# Patient Record
Sex: MALE | Race: WHITE | HISPANIC OR LATINO | Employment: OTHER | URBAN - METROPOLITAN AREA
[De-identification: names, ages, dates, MRNs, and addresses within clinical notes are randomized per-mention and may not be internally consistent; named-entity substitution may affect disease eponyms.]

---

## 2017-08-17 RX ORDER — LOSARTAN POTASSIUM 100 MG/1
1 TABLET ORAL DAILY
Refills: 0 | COMMUNITY
Start: 2017-07-11 | End: 2017-08-17 | Stop reason: SDUPTHER

## 2017-08-17 NOTE — TELEPHONE ENCOUNTER
Pharmacy requesting refill on losartan 100mg 1 po qd last seen 03/24/17 last labs 06/11/15 rx pending

## 2017-08-18 RX ORDER — LOSARTAN POTASSIUM 100 MG/1
100 TABLET ORAL DAILY
Qty: 30 TABLET | Refills: 5 | Status: SHIPPED | OUTPATIENT
Start: 2017-08-18

## 2017-08-18 NOTE — TELEPHONE ENCOUNTER
Call pt tell no lab since 6-11-15 needs CBC,CMP,Lipid,T4,TSH nU/A, Chest Xray EKG see me 2 weeks later

## 2018-02-21 RX ORDER — LOSARTAN POTASSIUM 100 MG/1
TABLET ORAL
Qty: 30 TABLET | Refills: 2 | Status: SHIPPED | OUTPATIENT
Start: 2018-02-21 | End: 2018-05-16 | Stop reason: SDUPTHER

## 2018-02-21 NOTE — TELEPHONE ENCOUNTER
Call tell pt not in new computer needs coemin be seen if lab due needs come in fasting OK 3 mo refills give time come in No more refills wioiithout being seen and getting in new computer

## 2018-04-18 ENCOUNTER — OFFICE VISIT (OUTPATIENT)
Dept: PRIMARY CARE CLINIC | Facility: CLINIC | Age: 63
End: 2018-04-18
Payer: MEDICAID

## 2018-04-18 VITALS
DIASTOLIC BLOOD PRESSURE: 92 MMHG | OXYGEN SATURATION: 98 % | WEIGHT: 186 LBS | HEIGHT: 72 IN | BODY MASS INDEX: 25.19 KG/M2 | HEART RATE: 68 BPM | SYSTOLIC BLOOD PRESSURE: 136 MMHG | RESPIRATION RATE: 18 BRPM

## 2018-04-18 DIAGNOSIS — I10 HYPERTENSION, UNSPECIFIED TYPE: ICD-10-CM

## 2018-04-18 DIAGNOSIS — K59.00 CONSTIPATION, UNSPECIFIED CONSTIPATION TYPE: ICD-10-CM

## 2018-04-18 DIAGNOSIS — R19.7 DIARRHEA, UNSPECIFIED TYPE: ICD-10-CM

## 2018-04-18 DIAGNOSIS — L21.9 SEBORRHEIC DERMATITIS: ICD-10-CM

## 2018-04-18 DIAGNOSIS — B35.1 ONYCHOMYCOSIS: Primary | ICD-10-CM

## 2018-04-18 DIAGNOSIS — G35 MULTIPLE SCLEROSIS: ICD-10-CM

## 2018-04-18 PROCEDURE — 99213 OFFICE O/P EST LOW 20 MIN: CPT | Mod: S$PBB,,, | Performed by: FAMILY MEDICINE

## 2018-04-18 PROCEDURE — 99214 OFFICE O/P EST MOD 30 MIN: CPT | Mod: PBBFAC,PN | Performed by: FAMILY MEDICINE

## 2018-04-18 PROCEDURE — 99999 PR PBB SHADOW E&M-EST. PATIENT-LVL IV: CPT | Mod: PBBFAC,,, | Performed by: FAMILY MEDICINE

## 2018-04-18 RX ORDER — AMOXICILLIN 500 MG
1 CAPSULE ORAL DAILY
COMMUNITY

## 2018-04-18 RX ORDER — ERGOCALCIFEROL 1.25 MG/1
50000 CAPSULE ORAL
COMMUNITY

## 2018-04-18 RX ORDER — ASPIRIN 81 MG/1
81 TABLET ORAL DAILY
COMMUNITY

## 2018-04-18 RX ORDER — LACTULOSE 10 G/15ML
10 SOLUTION ORAL 3 TIMES DAILY
Qty: 450 ML | Refills: 0 | Status: SHIPPED | OUTPATIENT
Start: 2018-04-18 | End: 2018-04-28

## 2018-04-18 RX ORDER — DIMETHYL FUMARATE 240 MG/1
CAPSULE ORAL
COMMUNITY
Start: 2018-03-24 | End: 2018-04-18

## 2018-04-18 RX ORDER — CLOTRIMAZOLE AND BETAMETHASONE DIPROPIONATE 10; .64 MG/G; MG/G
CREAM TOPICAL 2 TIMES DAILY
Qty: 45 G | Refills: 1 | Status: SHIPPED | OUTPATIENT
Start: 2018-04-18

## 2018-04-18 RX ORDER — ESOMEPRAZOLE MAGNESIUM 40 MG/1
40 CAPSULE, DELAYED RELEASE ORAL
COMMUNITY

## 2018-04-18 NOTE — PROGRESS NOTES
Subjective:       Patient ID: Jose Phillips is a 62 y.o. male.    Chief Complaint: Nail Problem    HPI: 62-year-old in for toenail fungus --and had colonoscopies done with Dr. Shaffer--unable to be cleaned out enough to get a valid procedure patient wants to go to [facility].  Patient on dicyclomine 3 times a day--still with problems with bowel movement--1 day soft bowel movements in the next day a hard bowel movement--then gets diarrhea and has no morning.      ROS:  Skin: no psoriasis, eczema, skin cancer-+ seborrheic dermatitis --uses hyaluronic acid from [place], onychomycosis--toenail fungus 2-3 months ago--toenail fell off at a smaller toenail underneath it appeared to have a fungus  HEENT: No headache, ocular pain, blurred vision, diplopia, epistaxis, hoarseness change in voice, thyroid trouble  Lung: No pneumonia, asthma, Tb, wheezing, SOB, no smoking  Heart: No chest pain, ankle edema, palpitations, MI, eren murmur, +hypertension, no  hyperlipidemia  Abdomen: See history of present illness--no  ulcers, hepatitis, gallbladder disease, melena, hematochezia, hematemesis  : no UTI, renal disease, stones, prostate  MS: no fractures, O/A, lupus, rheumatoid, gout--history of fractured rib years ago  Neuro: No dizziness, LOC, seizures   No diabetes, no anemia, no anxiety, no depression   Having problems with mother's sister-in-law over 6 session--patient with multiple scl      erosis and told that he was not able to handle his funds   , 2 children and 1 stepchild, lives with wife, pesticide due to multiple sclerosis      Objective:   Physical Exam:  General: Well nourished, well developed, no acute distress --up in a wheelchair has MS  Skin: Dry scaly areas on the face--- thickened toenails great toe  HEENT: Eyes PERRLA, EOM intact, nose patent, throat non-erythematous ears TM clear  NECK: Supple, no bruits, No JVD, no nodes  Lungs: Clear, no rales, rhonchi, wheezing  Heart: Regular rate and rhythm,  no murmurs, gallops, or rubs  Abdomen: flat, bowel sounds positive, no tenderness, or organomegaly  MS: Range of motion and muscle strength intact  Neuro: Alert, CN intact, oriented X 3  Extremities: No cyanosis, clubbing, or edema         Assessment:       1. Onychomycosis    2. Seborrheic dermatitis    3. Hypertension, unspecified type    4. Multiple sclerosis    5. Constipation, unspecified constipation type    6. Diarrhea, unspecified type        Plan:       Onychomycosis    Seborrheic dermatitis    Hypertension, unspecified type  -     Creatinine, serum; Future; Expected date: 04/18/2018  -     CBC auto differential; Future; Expected date: 04/18/2018  -     Comprehensive metabolic panel; Future; Expected date: 04/18/2018  -     EKG 12-lead; Future  -     Lipid panel; Future; Expected date: 04/18/2018  -     X-Ray Chest PA And Lateral; Future; Expected date: 04/18/2018  -     Urinalysis  -     Sedimentation rate, manual; Future; Expected date: 04/18/2018    Multiple sclerosis  -     T4, free; Future; Expected date: 04/18/2018  -     TSH; Future; Expected date: 04/18/2018    Constipation, unspecified constipation type  -     CT Abdomen Pelvis W Wo Contrast; Future; Expected date: 04/18/2018  -     Occult blood x 1, stool; Future; Expected date: 04/18/2018    Diarrhea, unspecified type  -     CT Abdomen Pelvis W Wo Contrast; Future; Expected date: 04/18/2018  -     Occult blood x 1, stool; Future; Expected date: 04/18/2018    Other orders  -     lactulose (CHRONULAC) 20 gram/30 mL Soln; Take 15 mLs (10 g total) by mouth 3 (three) times daily.  Dispense: 450 mL; Refill: 0  -     clotrimazole-betamethasone 1-0.05% (LOTRISONE) cream; Apply topically 2 (two) times daily.  Dispense: 45 g; Refill: 1      Lotrisone to the toenails--- see [facility] podiatry--- due to patient being on multiple sclerosis medications will avoid ketoconazole Lamisil unless podiatrist feels as if closely necessary needs topical antifungal as per  podiatrist  [facility]--needs colonoscopy was unable to get adequate prep ×2 with Dr. Shaffer--- CT scan abdomen and pelvis with and without oral contrast.  Needs to increase fiber, increase fruit and increased water trial of lactulose 1 tablespoon daily  Needs labs CBC CMP lipid T4 TSH sedimentation rate and UA if over 2-3 years chest x-ray EKG

## 2018-04-25 ENCOUNTER — TELEPHONE (OUTPATIENT)
Dept: PRIMARY CARE CLINIC | Facility: CLINIC | Age: 63
End: 2018-04-25

## 2018-04-25 DIAGNOSIS — R10.84 GENERALIZED ABDOMINAL PAIN: ICD-10-CM

## 2018-04-25 DIAGNOSIS — K59.00 CONSTIPATION, UNSPECIFIED CONSTIPATION TYPE: Primary | ICD-10-CM

## 2018-04-25 PROBLEM — I10 HYPERTENSION: Status: ACTIVE | Noted: 2018-04-25

## 2018-04-25 PROBLEM — G35 MULTIPLE SCLEROSIS: Status: ACTIVE | Noted: 2018-04-25

## 2018-04-25 PROBLEM — B35.1 ONYCHOMYCOSIS: Status: ACTIVE | Noted: 2018-04-25

## 2018-04-25 PROBLEM — R19.7 DIARRHEA: Status: ACTIVE | Noted: 2018-04-25

## 2018-04-25 PROBLEM — L21.9 SEBORRHEIC DERMATITIS: Status: ACTIVE | Noted: 2018-04-25

## 2018-04-27 ENCOUNTER — TELEPHONE (OUTPATIENT)
Dept: PRIMARY CARE CLINIC | Facility: CLINIC | Age: 63
End: 2018-04-27

## 2018-04-27 NOTE — TELEPHONE ENCOUNTER
----- Message from Cinthia Robison sent at 4/27/2018  2:50 PM CDT -----  Contact: self  Who Called:  you  Symptoms (please be specific): possible  Blockage in bowels  How long has patient had these symptoms:  month  Best Call Back Number: [phone]  Additional Information: patient is not sure if he needed to make an appointment  Cat scan with contrast. Patient states he was told to go to the ER to be cleaned out.  Please call patient.

## 2018-04-27 NOTE — TELEPHONE ENCOUNTER
"Spoke with patient, verbalized to patient per PCP patient needs to go to the ER for bowel obstruction that was confirmed by CT scan. Patient states "do I have to go today because I have a lot going on right now." Verbalized to patient that if this goes untreated it can cause life threatening complications so he needs to go today for further evaluation. Patient states "I will go this evening."   "

## 2018-05-16 RX ORDER — LOSARTAN POTASSIUM 100 MG/1
TABLET ORAL
Qty: 30 TABLET | Refills: 5 | Status: SHIPPED | OUTPATIENT
Start: 2018-05-16 | End: 2018-11-18 | Stop reason: SDUPTHER

## 2018-07-07 ENCOUNTER — HOSPITAL ENCOUNTER (EMERGENCY)
Facility: HOSPITAL | Age: 63
Discharge: HOME | End: 2018-07-07
Attending: EMERGENCY MEDICINE
Payer: MEDICAID

## 2018-07-07 VITALS
WEIGHT: 186 LBS | TEMPERATURE: 98 F | HEIGHT: 72 IN | SYSTOLIC BLOOD PRESSURE: 122 MMHG | BODY MASS INDEX: 25.19 KG/M2 | RESPIRATION RATE: 17 BRPM | OXYGEN SATURATION: 97 % | DIASTOLIC BLOOD PRESSURE: 73 MMHG | HEART RATE: 73 BPM
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
## 2018-07-07 DIAGNOSIS — S69.92XA INJURY OF LEFT WRIST, INITIAL ENCOUNTER: ICD-10-CM

## 2018-07-07 DIAGNOSIS — M25.532 WRIST PAIN, ACUTE, LEFT: Primary | ICD-10-CM

## 2018-07-07 PROCEDURE — 99283 EMERGENCY DEPT VISIT LOW MDM: CPT | Mod: 25

## 2018-07-07 PROCEDURE — 29125 APPL SHORT ARM SPLINT STATIC: CPT | Mod: LT

## 2018-07-07 PROCEDURE — 25000003 PHARM REV CODE 250: Performed by: NURSE PRACTITIONER

## 2018-07-07 RX ORDER — HYDROCODONE BITARTRATE AND ACETAMINOPHEN 5; 325 MG/1; MG/1
1 TABLET ORAL EVERY 4 HOURS PRN
Qty: 8 TABLET | Refills: 0 | Status: SHIPPED | OUTPATIENT
Start: 2018-07-07

## 2018-07-07 RX ORDER — DIMETHYL FUMARATE 240 MG/1
240 CAPSULE ORAL 2 TIMES DAILY
COMMUNITY

## 2018-07-07 RX ORDER — OXYCODONE AND ACETAMINOPHEN 5; 325 MG/1; MG/1
1 TABLET ORAL
Status: COMPLETED | OUTPATIENT
Start: 2018-07-07 | End: 2018-07-07

## 2018-07-07 RX ADMIN — OXYCODONE HYDROCHLORIDE AND ACETAMINOPHEN 1 TABLET: 5; 325 TABLET ORAL at 01:07

## 2018-07-07 NOTE — ED TRIAGE NOTES
Pt here with c/o left wrist pain after trip and fall this morning. Also c/o of left hip pain. Denies hitting head, any loc.

## 2018-07-07 NOTE — ED PROVIDER NOTES
Encounter Date: 7/7/2018        History     Chief Complaint   Patient presents with    Fall     Trip and fall this AM. Reports pain to left wrist and some discomfort in left hip.     This is a 62 y.o. Male with Hx of MS complaining of left wrist pain and left hip pain secondary to falling this morning. Left hip pain is described as very minor. Reports that he walks with a cane and that it slipped from under him. Pain is exacerbated with left wrist movement. No pain meds taken prior to arrival.          Review of patient's allergies indicates:  Allergies not on file  Past Medical History:   Diagnosis Date    Hypertension     MS (multiple sclerosis)      Past Surgical History:   Procedure Laterality Date    APPENDECTOMY      HERNIA REPAIR      TONSILLECTOMY       History reviewed. No pertinent family history.  Social History   Substance Use Topics    Smoking status: Former Smoker    Smokeless tobacco: Never Used    Alcohol use Yes     Review of Systems   Constitutional: Negative for chills and fever.   HENT: Negative for congestion, postnasal drip, rhinorrhea, sinus pressure and sore throat.    Eyes: Negative for pain and redness.   Respiratory: Negative for apnea, cough, choking, chest tightness, shortness of breath, wheezing and stridor.    Cardiovascular: Negative for chest pain, palpitations and leg swelling.   Gastrointestinal: Negative for abdominal pain, diarrhea, nausea and vomiting.   Genitourinary: Negative for dysuria, flank pain, penile pain and urgency.   Musculoskeletal: Positive for arthralgias (left wrist and left hip). Negative for back pain and neck pain.   Skin: Negative for color change, pallor, rash and wound.   Neurological: Negative for dizziness, seizures, syncope and light-headedness.   Hematological: Does not bruise/bleed easily.   Psychiatric/Behavioral: Negative for confusion. The patient is not nervous/anxious.        Physical Exam     Initial Vitals [07/07/18 1318]   BP Pulse  Resp Temp SpO2   132/71 72 16 98 °F (36.7 °C) 97 %      MAP       --         Physical Exam    Nursing note and vitals reviewed.  Constitutional: He appears well-developed and well-nourished. He is not diaphoretic. No distress.   HENT:   Head: Normocephalic and atraumatic.   Right Ear: External ear normal.   Left Ear: External ear normal.   Nose: Nose normal.   Eyes: Conjunctivae and EOM are normal. Pupils are equal, round, and reactive to light. Right eye exhibits no discharge. Left eye exhibits no discharge. No scleral icterus.   Neck: Normal range of motion. Neck supple. No thyromegaly present. No tracheal deviation present. No JVD present.   Cardiovascular: Normal rate.   Pulmonary/Chest: Breath sounds normal. No stridor. No respiratory distress. He has no wheezes. He has no rhonchi. He has no rales. He exhibits no tenderness.   Abdominal: Soft. There is no tenderness.   Musculoskeletal: He exhibits no edema.        Left wrist: He exhibits decreased range of motion (secondary to pain), tenderness and bony tenderness. He exhibits no swelling and no effusion.   Snuffbox tenderness to left wrist. No tenderness or deformity to left hip. No left lower extremity shortening.   Lymphadenopathy:     He has no cervical adenopathy.   Neurological: He is alert and oriented to person, place, and time. He has normal reflexes. He displays normal reflexes. No cranial nerve deficit or sensory deficit.   Skin: Skin is warm and dry. No rash and no abscess noted. No erythema. No pallor.   Psychiatric: He has a normal mood and affect. His behavior is normal. Judgment and thought content normal.         ED Course   Splint Application  Date/Time: 7/7/2018 2:28 PM  Performed by: MARYSOL JARAMILLO.  Authorized by: MARYSOL JARAMILLO.   Consent Done: Yes  Consent: Verbal consent obtained.  Risks and benefits: risks, benefits and alternatives were discussed  Consent given by: patient  Patient understanding: patient states understanding of the  "procedure being performed  Patient consent: the patient's understanding of the procedure matches consent given  Procedure consent: procedure consent matches procedure scheduled  Patient identity confirmed: , name, verbally with patient, provided demographic data and MRN  Time out: Immediately prior to procedure a "time out" was called to verify the correct patient, procedure, equipment, support staff and site/side marked as required.  Location details: left wrist  Splint type: thumb spica  Supplies used: cotton padding,  Ortho-Glass and elastic bandage  Post-procedure: The splinted body part was neurovascularly unchanged following the procedure.  Patient tolerance: Patient tolerated the procedure well with no immediate complications        Labs Reviewed - No data to display       Imaging Results          X-Ray Wrist Complete Left (Final result)  Result time 18 13:51:14    Final result by Ankita Mccarthy MD (18 13:51:14)                 Impression:      As above.      Electronically signed by: Ankita Mccarthy MD  Date:    2018  Time:    13:51             Narrative:    EXAMINATION:  XR WRIST COMPLETE 3 VIEWS LEFT    CLINICAL HISTORY:  Unspecified fall, initial encounter    TECHNIQUE:  PA, lateral, and oblique views of the left wrist were performed.    COMPARISON:  None    FINDINGS:  The lateral view is suboptimal for interpretation.  No definite fracture or dislocation is.                               X-Ray Hip 2 View Left (Final result)  Result time 18 13:52:20    Final result by Ankita Mccarthy MD (18 13:52:20)                 Impression:      No fracture or dislocation.      Electronically signed by: Ankita Mccarthy MD  Date:    2018  Time:    13:52             Narrative:    EXAMINATION:  XR HIP 2 VIEW LEFT    CLINICAL HISTORY:  Unspecified fall, initial encounter    TECHNIQUE:  AP view of the pelvis and frog leg lateral view of the left hip were " performed.    COMPARISON:  None    FINDINGS:  The bilateral sacroiliac joints, hip joints and pubic are intact.  No fracture or dislocation is identified.  Vascular calcifications are noted.                               X-Ray Forearm Left (Final result)  Result time 07/07/18 13:51:37    Final result by Ankita Mccarthy MD (07/07/18 13:51:37)                 Impression:      No fracture or dislocation.      Electronically signed by: Ankita Mccarthy MD  Date:    07/07/2018  Time:    13:51             Narrative:    EXAMINATION:  XR FOREARM LEFT    CLINICAL HISTORY:  Unspecified fall, initial encounter    TECHNIQUE:  AP and lateral views of the left forearm were performed.    COMPARISON:  None    FINDINGS:  No fracture or dislocation is identified.  The soft tissues appear normal.                                 Medical Decision Making:   Differential Diagnosis:   Includes ulna fracture, radius fracture, wrist fracture, hand fracture, compartment syndrome, neurovascular compromise, others  Clinical Tests:   Radiological Study: Ordered and Reviewed  ED Management:  62-year-old male presenting for evaluation of left wrist pain secondary to mechanical fall and occurred this morning.  Patient has history of MS and uses a cane to walk.  Also reports mild discomfort to left hip.  No shortening of the left lower extremity or left hip tenderness to palpation. There is left wrist tenderness.  Pain to left wrist is exacerbated by range of motion.  There is tenderness to the left snuffbox.  No obvious swelling, deformity, effusion, bruising, or other abnormality.  Compartments are soft.  X-rays are unremarkable, however I will apply a thumb spica splint is given snuffbox tenderness and possible scaphoid fracture.  Treated pain with Percocet.  Advised patient to follow up with Orthopedic surgery for re-evaluation further management.  ED return precautions given.  Patient and his family member expressed understanding of diagnosis,  discharge instructions, return precautions.  Other:   I have discussed this case with another health care provider.       <> Summary of the Discussion: My attending physician was available for consultation during this encounter.                      Clinical Impression:   The primary encounter diagnosis was Wrist pain, acute, left. Diagnoses of Fall and Injury of left wrist, initial encounter were also pertinent to this visit.      Disposition:   Disposition: Discharged  Condition: Stable                        Reese Calderón NP  07/07/18 1948

## 2018-07-07 NOTE — DISCHARGE INSTRUCTIONS
Follow-up with orthopedics for further evaluation management as discussed.    Take pain medication as needed only as prescribed.  Do not drive or drink alcohol when taking this medication.    Return to the emergency department for any new or worsening symptoms or as needed.

## 2018-11-20 RX ORDER — LOSARTAN POTASSIUM 100 MG/1
TABLET ORAL
Qty: 30 TABLET | Refills: 0 | Status: SHIPPED | OUTPATIENT
Start: 2018-11-20 | End: 2020-02-04 | Stop reason: SDUPTHER

## 2019-03-20 DIAGNOSIS — Z12.11 COLON CANCER SCREENING: ICD-10-CM

## 2020-02-04 ENCOUNTER — OFFICE VISIT (OUTPATIENT)
Dept: PRIMARY CARE CLINIC | Facility: CLINIC | Age: 65
End: 2020-02-04
Payer: COMMERCIAL

## 2020-02-04 ENCOUNTER — CLINICAL SUPPORT (OUTPATIENT)
Dept: PRIMARY CARE CLINIC | Facility: CLINIC | Age: 65
End: 2020-02-04
Payer: COMMERCIAL

## 2020-02-04 VITALS
OXYGEN SATURATION: 99 % | DIASTOLIC BLOOD PRESSURE: 82 MMHG | TEMPERATURE: 98 F | BODY MASS INDEX: 26.82 KG/M2 | HEART RATE: 62 BPM | SYSTOLIC BLOOD PRESSURE: 126 MMHG | WEIGHT: 198 LBS | HEIGHT: 72 IN | RESPIRATION RATE: 18 BRPM

## 2020-02-04 DIAGNOSIS — K59.00 CONSTIPATION, UNSPECIFIED CONSTIPATION TYPE: ICD-10-CM

## 2020-02-04 DIAGNOSIS — G35 MULTIPLE SCLEROSIS: Primary | ICD-10-CM

## 2020-02-04 DIAGNOSIS — I45.10 RIGHT BUNDLE BRANCH BLOCK: ICD-10-CM

## 2020-02-04 DIAGNOSIS — R19.7 DIARRHEA, UNSPECIFIED TYPE: ICD-10-CM

## 2020-02-04 DIAGNOSIS — B35.1 ONYCHOMYCOSIS: ICD-10-CM

## 2020-02-04 DIAGNOSIS — L21.9 SEBORRHEIC DERMATITIS: ICD-10-CM

## 2020-02-04 DIAGNOSIS — I10 HYPERTENSION, UNSPECIFIED TYPE: ICD-10-CM

## 2020-02-04 LAB
ALBUMIN SERPL BCP-MCNC: 4.5 G/DL (ref 3.5–5.2)
ALP SERPL-CCNC: 65 U/L (ref 38–126)
ALT SERPL W/O P-5'-P-CCNC: 19 U/L (ref 17–63)
ANION GAP SERPL CALC-SCNC: 9 MMOL/L (ref 8–16)
AST SERPL-CCNC: 14 U/L (ref 15–41)
BASOPHILS # BLD AUTO: 0.1 K/UL (ref 0–0.2)
BASOPHILS NFR BLD: 0.7 % (ref 0–1.9)
BILIRUB SERPL-MCNC: 1 MG/DL (ref 0.3–1.2)
BUN SERPL-MCNC: 18 MG/DL (ref 8–23)
CALCIUM SERPL-MCNC: 9.4 MG/DL (ref 8.6–10)
CHLORIDE SERPL-SCNC: 105 MMOL/L (ref 101–111)
CHOLEST SERPL-MCNC: 212 MG/DL (ref 80–200)
CHOLEST/HDLC SERPL: 4.9 {RATIO} (ref 2–5)
CO2 SERPL-SCNC: 26 MMOL/L (ref 23–29)
CREAT SERPL-MCNC: 0.5 MG/DL (ref 0.5–1.4)
DIFFERENTIAL METHOD: ABNORMAL
EOSINOPHIL # BLD AUTO: 0.4 K/UL (ref 0–0.5)
EOSINOPHIL NFR BLD: 5.9 % (ref 0–8)
ERYTHROCYTE [DISTWIDTH] IN BLOOD BY AUTOMATED COUNT: 13.5 % (ref 11.5–14.5)
ERYTHROCYTE [SEDIMENTATION RATE] IN BLOOD BY WESTERGREN METHOD: 28 MM/HR (ref 0–10)
EST. GFR  (AFRICAN AMERICAN): >60 ML/MIN/1.73 M^2
EST. GFR  (NON AFRICAN AMERICAN): >60 ML/MIN/1.73 M^2
GLUCOSE SERPL-MCNC: 106 MG/DL (ref 74–118)
HCT VFR BLD AUTO: 39.9 % (ref 40–54)
HDLC SERPL-MCNC: 43 MG/DL (ref 40–75)
HDLC SERPL: 20.3 % (ref 20–50)
HGB BLD-MCNC: 14 G/DL (ref 14–18)
LDLC SERPL CALC-MCNC: 147 MG/DL
LYMPHOCYTES # BLD AUTO: 0.7 K/UL (ref 1–4.8)
LYMPHOCYTES NFR BLD: 10.2 % (ref 18–48)
MCH RBC QN AUTO: 29.4 PG (ref 27–31)
MCHC RBC AUTO-ENTMCNC: 35.1 G/DL (ref 32–36)
MCV RBC AUTO: 84 FL (ref 82–98)
MONOCYTES # BLD AUTO: 0.5 K/UL (ref 0.3–1)
MONOCYTES NFR BLD: 7.3 % (ref 4–15)
NEUTROPHILS # BLD AUTO: 5.6 K/UL (ref 1.8–7.7)
NEUTROPHILS NFR BLD: 75.9 % (ref 38–73)
NONHDLC SERPL-MCNC: 169 MG/DL
PLATELET # BLD AUTO: 218 K/UL (ref 150–350)
PMV BLD AUTO: 7.7 FL (ref 9.2–12.9)
POTASSIUM SERPL-SCNC: 3.2 MMOL/L (ref 3.5–5.1)
PROT SERPL-MCNC: 7.2 G/DL (ref 6–8.4)
RBC # BLD AUTO: 4.75 M/UL (ref 4.6–6.2)
SODIUM SERPL-SCNC: 140 MMOL/L (ref 136–145)
T4 FREE SERPL-MCNC: 0.87 NG/DL (ref 0.61–1.12)
TRIGL SERPL-MCNC: 109 MG/DL (ref 30–150)
TSH SERPL DL<=0.005 MIU/L-ACNC: 2.33 UIU/ML (ref 0.45–5.33)
WBC # BLD AUTO: 7.3 K/UL (ref 3.9–12.7)

## 2020-02-04 PROCEDURE — 99999 PR PBB SHADOW E&M-EST. PATIENT-LVL IV: ICD-10-PCS | Mod: PBBFAC,,, | Performed by: FAMILY MEDICINE

## 2020-02-04 PROCEDURE — 3008F PR BODY MASS INDEX (BMI) DOCUMENTED: ICD-10-PCS | Mod: CPTII,S$GLB,, | Performed by: FAMILY MEDICINE

## 2020-02-04 PROCEDURE — 85025 COMPLETE CBC W/AUTO DIFF WBC: CPT

## 2020-02-04 PROCEDURE — 99214 OFFICE O/P EST MOD 30 MIN: CPT | Mod: S$GLB,,, | Performed by: FAMILY MEDICINE

## 2020-02-04 PROCEDURE — 3079F DIAST BP 80-89 MM HG: CPT | Mod: CPTII,S$GLB,, | Performed by: FAMILY MEDICINE

## 2020-02-04 PROCEDURE — 3074F SYST BP LT 130 MM HG: CPT | Mod: CPTII,S$GLB,, | Performed by: FAMILY MEDICINE

## 2020-02-04 PROCEDURE — 3008F BODY MASS INDEX DOCD: CPT | Mod: CPTII,S$GLB,, | Performed by: FAMILY MEDICINE

## 2020-02-04 PROCEDURE — 99999 PR PBB SHADOW E&M-EST. PATIENT-LVL IV: CPT | Mod: PBBFAC,,, | Performed by: FAMILY MEDICINE

## 2020-02-04 PROCEDURE — 85651 RBC SED RATE NONAUTOMATED: CPT

## 2020-02-04 PROCEDURE — 3079F PR MOST RECENT DIASTOLIC BLOOD PRESSURE 80-89 MM HG: ICD-10-PCS | Mod: CPTII,S$GLB,, | Performed by: FAMILY MEDICINE

## 2020-02-04 PROCEDURE — 99214 PR OFFICE/OUTPT VISIT, EST, LEVL IV, 30-39 MIN: ICD-10-PCS | Mod: S$GLB,,, | Performed by: FAMILY MEDICINE

## 2020-02-04 PROCEDURE — 84439 ASSAY OF FREE THYROXINE: CPT

## 2020-02-04 PROCEDURE — 3074F PR MOST RECENT SYSTOLIC BLOOD PRESSURE < 130 MM HG: ICD-10-PCS | Mod: CPTII,S$GLB,, | Performed by: FAMILY MEDICINE

## 2020-02-04 PROCEDURE — 36415 COLL VENOUS BLD VENIPUNCTURE: CPT | Mod: S$GLB,,, | Performed by: FAMILY MEDICINE

## 2020-02-04 PROCEDURE — 80061 LIPID PANEL: CPT

## 2020-02-04 PROCEDURE — 84443 ASSAY THYROID STIM HORMONE: CPT

## 2020-02-04 PROCEDURE — 36415 PR COLLECTION VENOUS BLOOD,VENIPUNCTURE: ICD-10-PCS | Mod: S$GLB,,, | Performed by: FAMILY MEDICINE

## 2020-02-04 PROCEDURE — 86803 HEPATITIS C AB TEST: CPT

## 2020-02-04 PROCEDURE — 80053 COMPREHEN METABOLIC PANEL: CPT

## 2020-02-04 RX ORDER — LOSARTAN POTASSIUM 100 MG/1
100 TABLET ORAL DAILY
Qty: 30 TABLET | Refills: 0 | Status: SHIPPED | OUTPATIENT
Start: 2020-02-04

## 2020-02-04 NOTE — PROGRESS NOTES
Subjective:       Patient ID: Jose Phillips is a 64 y.o. male.    Chief Complaint: Annual Exam and Medication Refill    HPI: 62-year-WM --patient retired electrical engineering ---patient wheelchair bound --history multiple sclerosis x7 years symptomatic but was diagnosed in 1984.  Eating well,+ BM--occasional loose bowel movements without any notice--stand up and then has to sit down quickly-- occas has wear diarrhea . Up wheelchair.       ROS:  Skin: no psoriasis, eczema, skin cancer-+ seborrheic dermatitis --uses hyaluronic acid from [place], onychomycosis--toenail fungus 2-3 months ago--toenail fell off at a smaller toenail underneath it appeared to have a fungus  HEENT: No headache, ocular pain, blurred vision, diplopia, epistaxis, hoarseness change in voice, thyroid trouble  Lung: No pneumonia, asthma, Tb, wheezing, SOB, no smoking  Heart: No chest pain, ankle edema, palpitations, MI, eren murmur, +hypertension, no  hyperlipidemia no stent bypass arrhythmia  Abdomen:  +loose bowel movements occasional rectal incontinence see history of present illness no nausea vomiting constipation-no  ulcers, hepatitis, gallbladder disease, melena, hematochezia, hematemesis  : no UTI, renal disease, stones, prostate  MS: no fractures, O/A, lupus, rheumatoid, gout--history of fractured rib years ago  Neuro: No dizziness, LOC, seizures   No diabetes, no anemia, no anxiety, no depression   Having problems with mother's sister-in-law over 6 session--patient with multiple sclerosis and told that he was not able to handle his funds   , 2 children and 1 stepchild, lives with wife, pesticide due to multiple sclerosis      Objective:   Physical Exam:  General: Well nourished, well developed, no acute distress --up in a wheelchair has MS  Skin: Dry scaly areas on the face--- thickened toenails great toe  HEENT: Eyes PERRLA, EOM intact, nose patent, throat non-erythematous ears TM clear  NECK: Supple, no  bruits, No JVD, no nodes  Lungs: Clear, no rales, rhonchi, wheezing  Heart: Regular rate and rhythm, no murmurs, gallops, or rubs  Abdomen: flat, bowel sounds positive, no tenderness, or organomegaly  MS: Range of motion and muscle strength intact  Neuro: Alert, CN intact, oriented X 3  Extremities: No cyanosis, clubbing, or edema         Assessment:       1. Multiple sclerosis    2. Onychomycosis    3. Seborrheic dermatitis    4. Hypertension, unspecified type    5. Constipation, unspecified constipation type    6. Diarrhea, unspecified type        Plan:       Multiple sclerosis  -     Ambulatory referral/consult to Gastroenterology; Future; Expected date: 02/11/2020    Onychomycosis    Seborrheic dermatitis    Hypertension, unspecified type  -     CBC auto differential; Future; Expected date: 02/04/2020  -     Comprehensive metabolic panel; Future; Expected date: 02/04/2020  -     Lipid panel; Future; Expected date: 02/04/2020  -     T4, free; Future; Expected date: 02/04/2020  -     TSH; Future; Expected date: 02/04/2020  -     POCT urine dipstick without microscope  -     Sedimentation rate; Future; Expected date: 02/04/2020  -     X-Ray Chest PA And Lateral; Future; Expected date: 02/04/2020  -     EKG 12-lead; Future    Constipation, unspecified constipation type  -     Hepatitis C antibody; Future; Expected date: 02/04/2020    Diarrhea, unspecified type  -     Ambulatory referral/consult to Gastroenterology; Future; Expected date: 02/11/2020    Other orders  -     losartan (COZAAR) 100 MG tablet; Take 1 tablet (100 mg total) by mouth once daily.  Dispense: 30 tablet; Refill: 0      multiple sclerosis---main problem tremor of the left hand was some left hand grass decreased strength in mainly problems with the right leg with ambulation patient up with a walker-should be being followed by a multiple sclerosis Clinic with neurologist  Rectal incontinence--see [facility] --occas has to wear diapers occasionally swells  himself---patient could controls this somewhat with diet by eating bananas rice which are constipating--when has loose stools needs to avoid fruit fiber--may need CT scan abdomen pelvis or colonoscopy but probably is a functional issue for multiple sclerosis Needs labs CBC CMP lipid T4 TSH sedimentation rate U/A chest x-ray EKG  Six months refills all medication  Hypertension blood pressure 126/82-excellent patient Needs arm blood pressure cuff check blood pressure daily  Seborrheic dermatitis/onychomycosis--patient using moisturizer creams for seborrhea and can see podiatrist if desires Lamisil or ketoconazole cream for onychomycosis  Health maintenance hepatitis C lipids HIV tetanus shingles:  Flu

## 2020-02-05 ENCOUNTER — TELEPHONE (OUTPATIENT)
Dept: PRIMARY CARE CLINIC | Facility: CLINIC | Age: 65
End: 2020-02-05

## 2020-02-05 LAB — HCV AB SERPL QL IA: NEGATIVE

## 2020-02-05 RX ORDER — HYDROCHLOROTHIAZIDE 25 MG/1
25 TABLET ORAL DAILY
COMMUNITY

## 2020-02-05 NOTE — TELEPHONE ENCOUNTER
Attempted to contact patient to verify dosage and how often he takes medication, no answer. Unable to LM due to [facility] being full

## 2020-02-05 NOTE — TELEPHONE ENCOUNTER
----- Message from Mely Watson sent at 2/5/2020  8:55 AM CST -----  Contact: Pt self Mobile/Home [phone]   Patient is calling for an RX refill or new RX.  Is this a refill or new RX:  Refill  RX name and strength: Hydrochlorothiazide 25 MG   Directions (copy/paste from chart):  N/A  Is this a 30 day or 90 day RX:  90  Local pharmacy or mail order pharmacy:  [facility] [address] [place] [address] [address] [facility] [address]  Pharmacy name and phone # [phone], fax# [phone]

## 2020-02-05 NOTE — TELEPHONE ENCOUNTER
----- Message from Mely Watson sent at 2/5/2020  8:48 AM CST -----  Contact: Pt self Mobile/Home [phone]   Patient is calling in regards to him wanting to let you know that he is taking Hydrochlorothiazide 25 mg.

## 2020-02-06 ENCOUNTER — TELEPHONE (OUTPATIENT)
Dept: PRIMARY CARE CLINIC | Facility: CLINIC | Age: 65
End: 2020-02-06

## 2020-02-06 PROBLEM — E78.5 BORDERLINE HYPERLIPIDEMIA: Status: ACTIVE | Noted: 2020-02-06

## 2020-02-06 NOTE — TELEPHONE ENCOUNTER
Called pt to find out which medication he was referring to.  VM full and can't leave a message.    ----- Message from Lin Godinez sent at 2/6/2020  2:29 PM CST -----  Contact: Patient  Type: Needs Medical Advice    Who Called:  Jose, patient  Symptoms (please be specific):  N/A  How long has patient had these symptoms:  N/A  Pharmacy name and phone #:    [id number] - [place] [address] [facility] [address]  [address] [address]  Phone: [phone] Fax: [phone]  Best Call Back Number: [phone]  Additional Information: Calling to check on his prescription request. Please call him. Thanks.

## 2020-02-06 NOTE — TELEPHONE ENCOUNTER
----- Message from Lin Godinez sent at 2/6/2020  2:29 PM CST -----  Contact: Patient  Type: Needs Medical Advice    Who Called:  Jose, patient  Symptoms (please be specific):  N/A  How long has patient had these symptoms:  N/A  Pharmacy name and phone #:    [id number] - [place] - [address] [facility] [address]  [address] [address]  Phone: [phone] Fax: [phone]  Best Call Back Number: [phone]  Additional Information: Calling to check on his prescription request. Please call him. Thanks.

## 2020-02-10 ENCOUNTER — TELEPHONE (OUTPATIENT)
Dept: PRIMARY CARE CLINIC | Facility: CLINIC | Age: 65
End: 2020-02-10

## 2020-02-10 NOTE — TELEPHONE ENCOUNTER
Called for patient, no answer. LM on VM to return my call. Called pharmacy. Patient has 8 refills. I let pharmacist know to get medication ready for pt in case he needs it

## 2020-02-10 NOTE — TELEPHONE ENCOUNTER
----- Message from Lin Godinez sent at 2/10/2020  3:19 PM CST -----  Contact: Patient  Type: Needs Medical Advice    Who Called:  Jose, patient  Symptoms (please be specific):  N/A  How long has patient had these symptoms:  N/A  Pharmacy name and phone #:    [id number] - [place] - [address] [facility] [address]  [address] [address]  Phone: [phone] Fax: [phone]  Best Call Back Number: [phone]  Additional Information: Calling you back regarding Rx hydroCHLOROthiazide (HYDRODIURIL) 25 MG tablet, it takes 25 mg in the morning every day. Please advise. Thanks.

## 2020-02-21 NOTE — TELEPHONE ENCOUNTER
----- Message from Jonatan Minaya MD sent at 2/6/2020  7:42 PM CST -----  Call tell pt lab Hep C neg ,sed rate slight increase 28 K 3.2 needs be on Micro K 10 1 po qd prob needed due hydrodiuril Chol 212 better 180,  better 100 Rest lab WNL. Needs be on low NA, low fat duiet exercise as tolerated, decrease weight as needed  
Age appropriate

## 2020-02-25 RX ORDER — POTASSIUM CHLORIDE 750 MG/1
10 CAPSULE, EXTENDED RELEASE ORAL ONCE
Qty: 30 CAPSULE | Refills: 5 | Status: SHIPPED | OUTPATIENT
Start: 2020-02-25 | End: 2020-02-25

## 2025-04-02 ENCOUNTER — PATIENT OUTREACH (OUTPATIENT)
Dept: ADMINISTRATIVE | Facility: CLINIC | Age: 70
End: 2025-04-02
Payer: MEDICARE

## 2025-04-02 NOTE — PROGRESS NOTES
C3 nurse attempted to contact patient for a TCC post hospital discharge follow-up call. The patient declined call at this time.     Unable to complete med rec

## 2025-04-07 ENCOUNTER — OFFICE VISIT (OUTPATIENT)
Dept: PRIMARY CARE CLINIC | Facility: CLINIC | Age: 70
End: 2025-04-07
Payer: MEDICARE

## 2025-04-07 VITALS
WEIGHT: 200 LBS | HEART RATE: 72 BPM | OXYGEN SATURATION: 95 % | HEIGHT: 75 IN | BODY MASS INDEX: 24.87 KG/M2 | DIASTOLIC BLOOD PRESSURE: 83 MMHG | SYSTOLIC BLOOD PRESSURE: 165 MMHG

## 2025-04-07 DIAGNOSIS — I45.10 RIGHT BUNDLE BRANCH BLOCK: ICD-10-CM

## 2025-04-07 DIAGNOSIS — K59.00 CONSTIPATION, UNSPECIFIED CONSTIPATION TYPE: ICD-10-CM

## 2025-04-07 DIAGNOSIS — D18.03 HEMANGIOMA OF LIVER: ICD-10-CM

## 2025-04-07 DIAGNOSIS — Z76.89 ENCOUNTER TO ESTABLISH CARE: ICD-10-CM

## 2025-04-07 DIAGNOSIS — D32.9 MENINGIOMA: ICD-10-CM

## 2025-04-07 DIAGNOSIS — Z79.899 OTHER LONG TERM (CURRENT) DRUG THERAPY: ICD-10-CM

## 2025-04-07 DIAGNOSIS — D35.02 BILATERAL ADRENAL ADENOMAS: ICD-10-CM

## 2025-04-07 DIAGNOSIS — G35 MULTIPLE SCLEROSIS: ICD-10-CM

## 2025-04-07 DIAGNOSIS — S82.54XD CLOSED NONDISPLACED FRACTURE OF MEDIAL MALLEOLUS OF RIGHT TIBIA WITH ROUTINE HEALING, SUBSEQUENT ENCOUNTER: ICD-10-CM

## 2025-04-07 DIAGNOSIS — Z00.00 ANNUAL PHYSICAL EXAM: ICD-10-CM

## 2025-04-07 DIAGNOSIS — M25.474 SWELLING OF FOOT JOINT, RIGHT: ICD-10-CM

## 2025-04-07 DIAGNOSIS — D35.01 BILATERAL ADRENAL ADENOMAS: ICD-10-CM

## 2025-04-07 DIAGNOSIS — I10 HYPERTENSION, UNSPECIFIED TYPE: ICD-10-CM

## 2025-04-07 DIAGNOSIS — I82.5Y1 CHRONIC DEEP VEIN THROMBOSIS (DVT) OF PROXIMAL VEIN OF RIGHT LOWER EXTREMITY: ICD-10-CM

## 2025-04-07 DIAGNOSIS — I82.A21 CHRONIC THROMBOSIS OF RIGHT AXILLARY VEIN: ICD-10-CM

## 2025-04-07 DIAGNOSIS — S82.821D CLOSED TORUS FRACTURE OF DISTAL END OF RIGHT FIBULA WITH ROUTINE HEALING, SUBSEQUENT ENCOUNTER: ICD-10-CM

## 2025-04-07 DIAGNOSIS — E78.5 BORDERLINE HYPERLIPIDEMIA: ICD-10-CM

## 2025-04-07 DIAGNOSIS — Z09 HOSPITAL DISCHARGE FOLLOW-UP: Primary | ICD-10-CM

## 2025-04-07 PROBLEM — E78.00 PURE HYPERCHOLESTEROLEMIA: Status: ACTIVE | Noted: 2021-11-26

## 2025-04-07 PROBLEM — R73.03 PRE-DIABETES: Status: ACTIVE | Noted: 2019-06-03

## 2025-04-07 PROBLEM — I82.409 DVT OF LOWER EXTREMITY (DEEP VENOUS THROMBOSIS): Status: ACTIVE | Noted: 2025-03-28

## 2025-04-07 PROCEDURE — 99999 PR PBB SHADOW E&M-EST. PATIENT-LVL V: CPT | Mod: PBBFAC,,, | Performed by: STUDENT IN AN ORGANIZED HEALTH CARE EDUCATION/TRAINING PROGRAM

## 2025-04-07 RX ORDER — LOSARTAN POTASSIUM 50 MG/1
50 TABLET ORAL DAILY
Qty: 90 TABLET | Refills: 3 | Status: SHIPPED | OUTPATIENT
Start: 2025-04-07 | End: 2026-04-07

## 2025-04-07 RX ORDER — FUROSEMIDE 20 MG/1
20 TABLET ORAL DAILY
Qty: 30 TABLET | Refills: 11 | Status: SHIPPED | OUTPATIENT
Start: 2025-04-07

## 2025-04-07 RX ORDER — HYDROCHLOROTHIAZIDE 12.5 MG/1
12.5 TABLET ORAL DAILY
Qty: 90 TABLET | Refills: 3 | Status: SHIPPED | OUTPATIENT
Start: 2025-04-07

## 2025-04-07 RX ORDER — FUROSEMIDE 20 MG/1
20 TABLET ORAL
COMMUNITY
End: 2025-04-07

## 2025-04-07 RX ORDER — OMEGA-3 FATTY ACIDS 1000 MG
2 CAPSULE ORAL DAILY
COMMUNITY

## 2025-04-07 RX ORDER — ATORVASTATIN CALCIUM 40 MG/1
1 TABLET, FILM COATED ORAL DAILY
COMMUNITY
Start: 2025-03-28 | End: 2026-03-28

## 2025-04-07 RX ORDER — LACTULOSE 10 G/15ML
10 SOLUTION ORAL; RECTAL DAILY PRN
Qty: 237 ML | Refills: 2 | Status: SHIPPED | OUTPATIENT
Start: 2025-04-07

## 2025-04-07 RX ORDER — POLYETHYLENE GLYCOL 3350 17 G/17G
17 POWDER, FOR SOLUTION ORAL DAILY PRN
Qty: 238 G | Refills: 2 | Status: SHIPPED | OUTPATIENT
Start: 2025-04-07

## 2025-04-07 NOTE — PATIENT INSTRUCTIONS
Assessment & Plan    D32.9 Meningioma  G35 Multiple sclerosis  I82.A21 Chronic thrombosis of right axillary vein  I82.5Y1 Chronic DVT of proximal vein of right lower extremity  Z09 Hospital discharge follow-up  Z76.89 Encounter to establish care  Z00.00 Annual physical exam  I10 Hypertension, unspecified type  E78.5 Borderline hyperlipidemia  I45.10 Right bundle branch block  S82.821D Closed torus fracture of distal end of right fibula with routine healing, subsequent encounter  S82.54XD Closed nondisplaced fracture of medial malleolus of right tibia with routine healing, subsequent encounter  M25.474 Swelling of foot joint, right  D18.03 Hemangioma of liver  D35.01, D35.02 Bilateral adrenal adenomas  K59.00 Constipation, unspecified constipation type  Z79.899 Other long term (current) drug therapy    PLAN SUMMARY:  - Started lactulose 10-15 mL daily as needed for constipation management  - Initiated Eliquis for DVT management  - Started Lasix for fluid management if foot swelling recurs  - Deferred statin therapy due to concerns about potential side effects  - Referred to Cardiology for HTN and DVT management  - Referred to Neurology for MS management  - Referred to Podiatry for foot care  - Follow up to consider obtaining a home BP cuff for monitoring  - Follow up to reassess BP control and overall treatment plan    G35 MULTIPLE SCLEROSIS:  - Referred to Neurology for MS management.    I82.5Y1 CHRONIC DVT OF PROXIMAL VEIN OF RIGHT LOWER EXTREMITY:  - Initiated Eliquis for DVT management as per hospital discharge recommendations.  - Referred to Cardiology for DVT management.    Z09 HOSPITAL DISCHARGE FOLLOW-UP:  - Recent hospitalization for fecal impaction and sepsis.    I10 HYPERTENSION, UNSPECIFIED TYPE:  - BP readings indicate uncontrolled HTN, contrary to perception of low BP.  - Plan to improve BP control, considering limited mobility.  - Explained importance of BP control and its impact on overall  health.  - Referred to Cardiology for HTN management.  - Follow up to consider obtaining a home BP cuff for monitoring.    E78.5 BORDERLINE HYPERLIPIDEMIA:  - Deferred statin therapy due to concerns about potential side effects.    M25.474 SWELLING OF FOOT JOINT, RIGHT:  - Recent foot swelling improved significantly.  - Started Lasix for fluid management given history of significant edema, to be used if foot swelling recurs.  - Jose to monitor for recurrence of foot swelling.  - Referred to Podiatry for foot care.    K59.00 CONSTIPATION, UNSPECIFIED CONSTIPATION TYPE:  - No bowel movement for 4-5 days, suggesting recurrent constipation.  - Started lactulose 10-15 mL daily as needed for constipation management, given limited mobility.  - Discussed relationship between limited mobility and constipation.  - Jose to track bowel movements and consistency.    Z79.899 OTHER LONG TERM (CURRENT) DRUG THERAPY:  - Started Lasix for fluid management given history of significant edema, to be used if foot swelling recurs.  - Started lactulose 10-15 mL daily as needed for constipation management.  - Initiated Eliquis for DVT management as per hospital discharge

## 2025-04-07 NOTE — PROGRESS NOTES
Assessment:       1. Hospital discharge follow-up    2. Encounter to establish care    3. Annual physical exam    4. Multiple sclerosis    5. Hypertension, unspecified type    6. Chronic thrombosis of right axillary vein    7. Borderline hyperlipidemia    8. Right bundle branch block    9. Closed torus fracture of distal end of right fibula with routine healing, subsequent encounter    10. Closed nondisplaced fracture of medial malleolus of right tibia with routine healing, subsequent encounter    11. Swelling of foot joint, right    12. Hemangioma of liver    13. Bilateral adrenal adenomas    14. Constipation, unspecified constipation type    15. Chronic deep vein thrombosis (DVT) of proximal vein of right lower extremity    16. Meningioma    17. Other long term (current) drug therapy           Plan:     Assessment & Plan    D32.9 Meningioma  G35 Multiple sclerosis  I82.A21 Chronic thrombosis of right axillary vein  I82.5Y1 Chronic DVT of proximal vein of right lower extremity  Z09 Hospital discharge follow-up  Z76.89 Encounter to establish care  Z00.00 Annual physical exam  I10 Hypertension, unspecified type  E78.5 Borderline hyperlipidemia  I45.10 Right bundle branch block  S82.821D Closed torus fracture of distal end of right fibula with routine healing, subsequent encounter  S82.54XD Closed nondisplaced fracture of medial malleolus of right tibia with routine healing, subsequent encounter  M25.474 Swelling of foot joint, right  D18.03 Hemangioma of liver  D35.01, D35.02 Bilateral adrenal adenomas  K59.00 Constipation, unspecified constipation type  Z79.899 Other long term (current) drug therapy    PLAN SUMMARY:  - Started lactulose 10-15 mL daily as needed for constipation management  - Initiated Eliquis for DVT management  - Started Lasix for fluid management if foot swelling recurs  - Deferred statin therapy due to concerns about potential side effects  - Referred to Cardiology for HTN and DVT management  -  Referred to Neurology for MS management  - Referred to Podiatry for foot care  - Follow up to consider obtaining a home BP cuff for monitoring  - Follow up to reassess BP control and overall treatment plan    G35 MULTIPLE SCLEROSIS:  - Referred to Neurology for MS management.    I82.5Y1 CHRONIC DVT OF PROXIMAL VEIN OF RIGHT LOWER EXTREMITY:  - Initiated Eliquis for DVT management as per hospital discharge recommendations.  - Referred to Cardiology for DVT management.    Z09 HOSPITAL DISCHARGE FOLLOW-UP:  - Recent hospitalization for fecal impaction and sepsis.    I10 HYPERTENSION, UNSPECIFIED TYPE:  - BP readings indicate uncontrolled HTN, contrary to perception of low BP.  - Plan to improve BP control, considering limited mobility.  - Explained importance of BP control and its impact on overall health.  - Referred to Cardiology for HTN management.  - Follow up to consider obtaining a home BP cuff for monitoring.    E78.5 BORDERLINE HYPERLIPIDEMIA:  - Deferred statin therapy due to concerns about potential side effects.    M25.474 SWELLING OF FOOT JOINT, RIGHT:  - Recent foot swelling improved significantly.  - Started Lasix for fluid management given history of significant edema, to be used if foot swelling recurs.  - Jose to monitor for recurrence of foot swelling.  - Referred to Podiatry for foot care.    K59.00 CONSTIPATION, UNSPECIFIED CONSTIPATION TYPE:  - No bowel movement for 4-5 days, suggesting recurrent constipation.  - Started lactulose 10-15 mL daily as needed for constipation management, given limited mobility.  - Discussed relationship between limited mobility and constipation.  - Jose to track bowel movements and consistency.    Z79.899 OTHER LONG TERM (CURRENT) DRUG THERAPY:  - Started Lasix for fluid management given history of significant edema, to be used if foot swelling recurs.  - Started lactulose 10-15 mL daily as needed for constipation management.  - Initiated Eliquis for DVT management  as per hospital discharge recommendations.             Hospital discharge follow-up    Encounter to establish care    Annual physical exam  -     CBC Auto Differential; Future; Expected date: 04/07/2025  -     Comprehensive Metabolic Panel; Future; Expected date: 04/07/2025  -     Lipid Panel; Future; Expected date: 04/07/2025  -     Hemoglobin A1C; Future; Expected date: 04/07/2025  -     TSH; Future; Expected date: 04/07/2025    Multiple sclerosis  -     Ambulatory referral/consult to Neurology; Future; Expected date: 04/14/2025  -     furosemide (LASIX) 20 MG tablet; Take 1 tablet (20 mg total) by mouth once daily.  Dispense: 30 tablet; Refill: 11  -     CBC Auto Differential; Future; Expected date: 04/07/2025  -     Comprehensive Metabolic Panel; Future; Expected date: 04/07/2025  -     Lipid Panel; Future; Expected date: 04/07/2025  -     Hemoglobin A1C; Future; Expected date: 04/07/2025  -     TSH; Future; Expected date: 04/07/2025    Hypertension, unspecified type  -     Ambulatory referral/consult to Cardiology; Future; Expected date: 04/14/2025  -     furosemide (LASIX) 20 MG tablet; Take 1 tablet (20 mg total) by mouth once daily.  Dispense: 30 tablet; Refill: 11  -     losartan (COZAAR) 50 MG tablet; Take 1 tablet (50 mg total) by mouth once daily.  Dispense: 90 tablet; Refill: 3  -     hydroCHLOROthiazide 12.5 MG Tab; Take 1 tablet (12.5 mg total) by mouth once daily.  Dispense: 90 tablet; Refill: 3  -     CBC Auto Differential; Future; Expected date: 04/07/2025  -     Comprehensive Metabolic Panel; Future; Expected date: 04/07/2025  -     Lipid Panel; Future; Expected date: 04/07/2025  -     Hemoglobin A1C; Future; Expected date: 04/07/2025  -     TSH; Future; Expected date: 04/07/2025    Chronic thrombosis of right axillary vein  -     Ambulatory referral/consult to Cardiology; Future; Expected date: 04/14/2025    Borderline hyperlipidemia    Right bundle branch block    Closed torus fracture of distal  end of right fibula with routine healing, subsequent encounter    Closed nondisplaced fracture of medial malleolus of right tibia with routine healing, subsequent encounter    Swelling of foot joint, right  -     Ambulatory referral/consult to Podiatry; Future; Expected date: 04/14/2025    Hemangioma of liver  Comments:  on CT at [facility] on 3/22/25.    Bilateral adrenal adenomas  Comments:  on CT at [facility] on 3/22/25.    Constipation, unspecified constipation type  -     polyethylene glycol (GLYCOLAX) 17 gram/dose powder; Take 17 g by mouth daily as needed for Constipation.  Dispense: 238 g; Refill: 2  -     lactulose (CHRONULAC) 10 gram/15 mL solution; Take 15 mLs (10 g total) by mouth daily as needed (constipation).  Dispense: 237 mL; Refill: 2    Chronic deep vein thrombosis (DVT) of proximal vein of right lower extremity  -     apixaban (ELIQUIS) 5 mg Tab; Take 1 tablet (5 mg total) by mouth 2 (two) times daily.  Dispense: 180 tablet; Refill: 3    Meningioma  Comments:  1.6cm left parafalcine mass c/w meningioma    Other long term (current) drug therapy  -     CBC Auto Differential; Future; Expected date: 04/07/2025  -     Comprehensive Metabolic Panel; Future; Expected date: 04/07/2025  -     Lipid Panel; Future; Expected date: 04/07/2025  -     Hemoglobin A1C; Future; Expected date: 04/07/2025  -     TSH; Future; Expected date: 04/07/2025                This note was generated with the assistance of ambient listening technology. Verbal consent was obtained by the patient and accompanying visitor(s) for the recording of patient appointment to facilitate this note. I attest to having reviewed and edited the generated note for accuracy, though some syntax or spelling errors may persist. Please contact the author of this note for any clarification.      Subjective:           Patient ID: Jose Tapiajulia   Age:  69 y.o.  Sex: male     Chief Complaint:   Establish Care (MS / est care )      History of Present  "Illness:    Jose Phillips is a 69 y.o. male who presents today with a chief complaint of Establish Care (MS / est care )  .    Hospital D/c Note:  "Patient ID:  Jose Phillips  [id number]  69 y.o.  1955    Admit date: 3/22/2025    Discharge date: 3/28/2025    Admitting Physician: Kandi Lacey MD     Discharge Physician: Kandi Lacey MD    Admission Diagnoses: Concern for Large Bowel Obstruction, Fecal Impaction, Multiple Sclerosis, HTN, GERD, Constipation, SIRS, LE Edema    Discharge Diagnoses: Multiple Sclerosis, HTN, GERD, LE Edema, Menengioma, Chronic Constipation    Admission Condition: serious    Discharged Condition: stable    Indication for Admission: Fecal impaction iso chronic Multiple Sclerosis, SIRS +    Hospital Course: Joes Phillips is a 69 y.o. male with pmhx of multiple sclerosis, HTN, and GERD who was admitted on 3/22 for fecal impaction and was SIRS positive. Pt transferred from [facility] for surgical evaluation for concern of large bowel obstruction and underwent fecal disimpaction in the ED and trauma surgery was consulted. Trauma surgery recommended medical management with BID enemas and increased bowel regimen. CT A/P imaging noted large bowel obstruction due to fecal impaction versus colonic pseudo-obstruction. Zosyn was started for concern of underlying infection iso possible bacterial translocation however after 2 days was discontinued as no signs of systemic infection. Daily KUBs were ordered to monitor for improvement of colonic distention. Patient continued to have 3-4 Bms daily with BID Miralax and Senna and prn enemas however no improvement of colonic distention on imaging so one dose Neostigmine and TID Lactulose was started with improvement of abdominal imaging.     Neurology which the patient was previously following outpatient was consulted for concern of Multiple Sclerosis flare. MRI Brain and thoracic/cervical " "spine was obtained which showed multiple hyperintense lesions consistent with previous imaging. Neurology recommended continuing home Multiple Sclerosis medication and follow up outpatient. PT and OT recommended hospital bed at home, debra lift and ramp. Hospital bed at home was sent to patient's home on day of discharge.     DVT US showed non-occlusive DVT in right CFV and patient was started on therapeutic Lovenox and transitioned to Apixaban prior to discharge. Will likely require lifelong therapy as he is immobile 2/2 multiple sclerosis. Also incidentally found to have 1.6 cm left parafalcine meningioma but had no new neurological deficits.    Patient discharged with Miralax, Senna, Lactulose. Consider starting cisapride/prucalopride outpatient if constipation not improving.    PCP to Follow Up: Multiple Sclerosis, Fecal obstruction, DVT, Meningioma    Consults: Trauma surgery, GI, neurology    Significant Diagnostic Studies:     MRI brain and C/T - spine completed 3/23 w/ hyperintese lesion through out the thoracic cord, cervical cord, brainstem and cerebrum c/w with previous MRIs, no apparent new lesions. 1.6cm left parafalcine mass c/w meningioma "    Hospital note states:  "DVT US showed non-occlusive DVT in right CFV and patient was started on therapeutic Lovenox and transitioned to Apixaban prior to discharge. Will likely require lifelong therapy as he is immobile 2/2 multiple sclerosis. Also incidentally found to have 1.6 cm left parafalcine meningioma but had no new neurological deficits.     Patient discharged with Miralax, Senna, Lactulose. Consider starting cisapride/prucalopride outpatient if constipation not improving.    PCP to Follow Up: Multiple Sclerosis, Fecal obstruction, DVT, Meningioma "      Patient declines statin states he is not interested as he worries it will affect his heart long term.    Used immodium to stop his bowels after being in the hospital for significant constipation.    Is " not taking Lactulose.        History of Present Illness    CHIEF COMPLAINT:  Jose presents today for follow-up after recent hospitalization    RECENT HOSPITALIZATION:  He was discharged from the hospital on  at 3:00 AM. During his stay, he experienced difficulty with feeding due to hand weakness, requiring assistance with meals. He reports inadequate nutrition due to lack of feeding assistance when requested.    MULTIPLE SCLEROSIS:  He was diagnosed with Multiple Sclerosis at age 42 (now 69). Previously treated with Tecfidera (dimethyl fumarate) for symptom management. He has significant mobility limitations, requiring a gurney for medical appointments and an electric wheelchair at home, which is airline certified. He experiences hand weakness affecting his ability to hold utensils. He has bilateral lower extremity neuropathy from knees down.    DEEP VEIN THROMBOSIS:  He has a history of blood clot in his right foot that developed following a right heel fracture after a fall several years ago.    GASTROINTESTINAL:  He reports constipation for 4-5 days. Following hospital discharge, he experienced diarrhea which was managed with one dose of Imodium.    MEDICAL HISTORY:  He has pre-diabetes managed through dietary modifications including sugar reduction.    SURGICAL HISTORY:  His surgical history includes an appendectomy in 7th grade, hernia repair, and cyst removal. He has a thin scar across the mid-ear from an injury at 6 months old.    FAMILY HISTORY:  His younger brother was diagnosed with MS at age 20 and  from the condition at age 40. His mother had diabetes and experienced multiple mini-strokes. His father developed mild diabetes at age 70.    SOCIAL HISTORY:  He is a never smoker with occasional alcohol consumption approximately every two months. He denies recreational drug use.      ROS:  Cardiovascular: -lower extremity edema  Gastrointestinal: -abdominal pain, +diarrhea,  "+constipation  Neurological: +tingling, +nerve pain           Review of Systems   Constitutional:  Positive for fatigue. Negative for fever.   HENT: Negative.  Negative for congestion, rhinorrhea and sinus pressure.    Eyes: Negative.    Respiratory: Negative.  Negative for cough, shortness of breath and wheezing.    Cardiovascular: Negative.  Negative for chest pain, palpitations and leg swelling.   Gastrointestinal:  Positive for constipation and diarrhea. Negative for abdominal distention, abdominal pain, blood in stool, nausea and vomiting.   Endocrine: Negative.    Genitourinary: Negative.  Negative for difficulty urinating, flank pain and frequency.   Musculoskeletal:  Positive for arthralgias, back pain, gait problem and myalgias.   Skin: Negative.    Allergic/Immunologic: Negative for food allergies.   Neurological:  Positive for weakness and numbness.   Psychiatric/Behavioral: Negative.  Negative for sleep disturbance. The patient is not nervous/anxious.            Objective:        Vitals:    04/07/25 1020   BP: (!) 165/83   BP Location: Right arm   Patient Position: Lying   Pulse: 72   SpO2: 95%   Weight: 90.7 kg (200 lb)   Height: 6' 3" (1.905 m)       Body mass index is 25 kg/m².      Feet Before going to the hospital:      Feet Today:                Physical Exam  Constitutional:       Appearance: He is ill-appearing. He is not toxic-appearing.      Comments: As per BMI.   HENT:      Head: Normocephalic and atraumatic.      Right Ear: External ear normal.      Left Ear: External ear normal.      Nose: No congestion.      Mouth/Throat:      Pharynx: Oropharynx is clear.   Eyes:      Extraocular Movements: Extraocular movements intact.      Conjunctiva/sclera: Conjunctivae normal.   Cardiovascular:      Rate and Rhythm: Normal rate and regular rhythm.      Heart sounds: No murmur heard.  Pulmonary:      Effort: Pulmonary effort is normal. No respiratory distress.      Breath sounds: No wheezing. " "  Abdominal:      General: Bowel sounds are normal.      Palpations: Abdomen is soft.   Musculoskeletal:         General: Tenderness (bilateral shins TTP.) present. No swelling.      Cervical back: Normal range of motion.      Right lower leg: Edema (trace) present.      Left lower leg: Edema (trace) present.   Skin:     General: Skin is warm.      Capillary Refill: Capillary refill takes less than 2 seconds.      Coloration: Skin is not jaundiced.      Findings: Lesion (dry, cracking skin to dorsum of feet as imaged.) and rash present.   Neurological:      General: No focal deficit present.      Mental Status: He is alert and oriented to person, place, and time.      Motor: Weakness present.      Gait: Gait abnormal.   Psychiatric:         Mood and Affect: Mood normal.         Physical Exam    Extremities: Tenderness in left leg.               Past Medical History[1]    Lab Results   Component Value Date     03/29/2025     (H) 06/01/2024    K 4.0 03/29/2025    K 3.7 06/01/2024     03/22/2025     06/01/2024    CO2 24 03/29/2025    CO2 25 06/01/2024    BUN 9.0 03/29/2025    BUN 32 (H) 03/22/2025    CREATININE 0.50 (L) 03/29/2025    CREATININE 0.7 03/22/2025    GLUCOSE 119 (H) 03/29/2025    ANIONGAP 9 03/29/2025    ANIONGAP 16 03/22/2025     Lab Results   Component Value Date    HGBA1C 5.6 11/26/2021     No results found for: "BNP", "BNPTRIAGEBLO"    Lab Results   Component Value Date    WBC 8.3 03/29/2025    WBC 16.78 (H) 03/22/2025    HGB 12.6 (L) 03/29/2025    HGB 14.3 06/01/2024    HCT 37.7 (L) 03/29/2025    HCT 44.1 06/01/2024     03/22/2025     06/01/2024    GRAN 4.9 06/01/2024    GRAN 73.0 06/01/2024     Lab Results   Component Value Date    CHOL 184 05/28/2021    CHOL 212 (H) 02/04/2020    HDL 47 05/28/2021    HDL 43 02/04/2020    LDLCALC 123 05/28/2021    LDLCALC 147 (H) 02/04/2020    TRIG 68 05/28/2021    TRIG 109 02/04/2020        Encounter Medications[2]          "     [1]   Past Medical History:  Diagnosis Date    Glaucoma     Hypertension     MS (multiple sclerosis)     Multiple sclerosis    [2]   Outpatient Encounter Medications as of 4/7/2025   Medication Sig Dispense Refill    atorvastatin (LIPITOR) 40 MG tablet Take 1 tablet by mouth once daily.      co-enzyme Q-10 50 mg capsule Take 50 mg by mouth once daily.      dimethyl fumarate (TECFIDERA) 240 mg CpDR Take 240 mg by mouth 2 (two) times daily.      ergocalciferol (VITAMIN D2) 50,000 unit Cap Take 50,000 Units by mouth every 7 days.      fish oil-omega-3 fatty acids 300-1,000 mg capsule Take 1 capsule by mouth once daily.      fish oil-omega-3 fatty acids 300-1,000 mg capsule Take by mouth once daily.      multivitamin (THERAGRAN) per tablet Take 1 tablet by mouth once daily.      omega-3 fatty acids 1,000 mg Cap Take 2 capsules by mouth once daily.      vit C/E/zinc/lutein/zeaxanthin (OCUVITE EYE HEALTH ORAL) Take by mouth.      vitamin D 1000 units Tab Take 5,000 Units by mouth once daily.      apixaban (ELIQUIS) 5 mg Tab Take 1 tablet (5 mg total) by mouth 2 (two) times daily. 180 tablet 3    furosemide (LASIX) 20 MG tablet Take 1 tablet (20 mg total) by mouth once daily. 30 tablet 11    hydroCHLOROthiazide 12.5 MG Tab Take 1 tablet (12.5 mg total) by mouth once daily. 90 tablet 3    lactulose (CHRONULAC) 10 gram/15 mL solution Take 15 mLs (10 g total) by mouth daily as needed (constipation). 237 mL 2    losartan (COZAAR) 50 MG tablet Take 1 tablet (50 mg total) by mouth once daily. 90 tablet 3    polyethylene glycol (GLYCOLAX) 17 gram/dose powder Take 17 g by mouth daily as needed for Constipation. 238 g 2    [DISCONTINUED] apixaban (ELIQUIS) 5 mg Tab Take by mouth. (Patient not taking: Reported on 4/7/2025)      [DISCONTINUED] aspirin (ECOTRIN) 325 MG EC tablet Take 325 mg by mouth once daily. (Patient not taking: Reported on 4/7/2025)      [DISCONTINUED] aspirin (ECOTRIN) 81 MG EC tablet Take 81 mg by mouth once  daily. (Patient not taking: Reported on 4/7/2025)      [DISCONTINUED] clotrimazole-betamethasone 1-0.05% (LOTRISONE) cream Apply topically 2 (two) times daily. (Patient not taking: Reported on 4/7/2025) 45 g 1    [DISCONTINUED] dimethyl fumarate 240 mg CpDR Take 240 mg by mouth 2 (two) times daily. (Patient not taking: Reported on 4/7/2025)      [DISCONTINUED] esomeprazole (NEXIUM) 40 MG capsule Take 40 mg by mouth before breakfast. (Patient not taking: Reported on 4/7/2025)      [DISCONTINUED] esomeprazole (NEXIUM) 40 MG capsule Take 40 mg by mouth before breakfast. (Patient not taking: Reported on 4/7/2025)      [DISCONTINUED] furosemide (LASIX) 20 MG tablet Take 20 mg by mouth. (Patient not taking: Reported on 4/7/2025)      [DISCONTINUED] ginkgo biloba 400 mg Cap Take by mouth. (Patient not taking: Reported on 4/7/2025)      [DISCONTINUED] hydroCHLOROthiazide (HYDRODIURIL) 25 MG tablet Take 25 mg by mouth once daily. (Patient not taking: Reported on 4/7/2025)      [DISCONTINUED] HYDROcodone-acetaminophen (NORCO) 5-325 mg per tablet Take 1 tablet by mouth every 4 (four) hours as needed for Pain. (Patient not taking: Reported on 4/7/2025) 8 tablet 0    [DISCONTINUED] losartan (COZAAR) 100 MG tablet Take 1 tablet (100 mg total) by mouth once daily. (Patient not taking: Reported on 4/7/2025) 30 tablet 5    [DISCONTINUED] losartan (COZAAR) 100 MG tablet Take 1 tablet (100 mg total) by mouth once daily. (Patient not taking: Reported on 4/7/2025) 30 tablet 0     Facility-Administered Encounter Medications as of 4/7/2025   Medication Dose Route Frequency Provider Last Rate Last Admin    [DISCONTINUED] GENERIC EXTERNAL MEDICATION     Provider, Generic External Data

## 2025-04-09 ENCOUNTER — TELEPHONE (OUTPATIENT)
Dept: PRIMARY CARE CLINIC | Facility: CLINIC | Age: 70
End: 2025-04-09
Payer: MEDICARE

## 2025-04-09 NOTE — TELEPHONE ENCOUNTER
Called pt regarding message. Pt stated that he urinates normally. When he was in the hospital they had a bag that they taped to his groin so he didn't make a mess when urinating.

## 2025-04-09 NOTE — TELEPHONE ENCOUNTER
"----- Message from Mukund Minaya MD sent at 4/8/2025  8:04 PM CDT -----  Regarding: Bag for his bowels?  Contact: Mobile  [phone]  What is patient referring to when writing, "Bag for his bowels?"  Does patient have a colostomy, ilostomy, urostomy?  What bags are these and who is caring for the ostomy site?  Does he need a wound care provider?As for diapers and pads.. I've never written medical orders for this before.  I added an prescription to [employer] for the adult diapers, but it states they are not on formulary, so I don't think they will be covered. - SB  ----- Message -----  From: Billie Bright MA  Sent: 4/8/2025  11:19 AM CDT  To: Mukund Minaya MD      ----- Message -----  From: Mely Watson  Sent: 4/8/2025  11:02 AM CDT  To: [facility] Staff    Comments: Patient would like to put in an order for a bag that for his bowels, pads, and diapers X-Large. Patient said that he cannot walk. Please give the patient a call. [id number] - [place] - [address] AT [facility] [address] [id number]: [phone] Fax: [phone]  ----- Message -----  From: Mely Watson  Sent: 4/8/2025  11:01 AM CDT  To: [facility] Staff    Comments: Patient would like to put in an order for a bag that for his bowels, pads, and diapers. Patient said that he cannot walk. Please give the patient a call. [facility][id number] - [place] - [address] AT [facility] [place] [address] [id number]: [phone] Fax: [phone]  "

## 2025-04-22 ENCOUNTER — OFFICE VISIT (OUTPATIENT)
Dept: PRIMARY CARE CLINIC | Facility: CLINIC | Age: 70
End: 2025-04-22
Payer: MEDICARE

## 2025-04-22 ENCOUNTER — TELEPHONE (OUTPATIENT)
Dept: PRIMARY CARE CLINIC | Facility: CLINIC | Age: 70
End: 2025-04-22
Payer: MEDICARE

## 2025-04-22 VITALS
DIASTOLIC BLOOD PRESSURE: 82 MMHG | OXYGEN SATURATION: 97 % | HEART RATE: 68 BPM | RESPIRATION RATE: 18 BRPM | SYSTOLIC BLOOD PRESSURE: 148 MMHG | TEMPERATURE: 98 F

## 2025-04-22 DIAGNOSIS — I10 HYPERTENSION, UNSPECIFIED TYPE: ICD-10-CM

## 2025-04-22 PROCEDURE — 99213 OFFICE O/P EST LOW 20 MIN: CPT | Mod: S$GLB,,,

## 2025-04-22 PROCEDURE — 4010F ACE/ARB THERAPY RXD/TAKEN: CPT | Mod: CPTII,S$GLB,,

## 2025-04-22 PROCEDURE — 1126F AMNT PAIN NOTED NONE PRSNT: CPT | Mod: CPTII,S$GLB,,

## 2025-04-22 PROCEDURE — 99999 PR PBB SHADOW E&M-EST. PATIENT-LVL V: CPT | Mod: PBBFAC,,,

## 2025-04-22 PROCEDURE — 3077F SYST BP >= 140 MM HG: CPT | Mod: CPTII,S$GLB,,

## 2025-04-22 PROCEDURE — 3079F DIAST BP 80-89 MM HG: CPT | Mod: CPTII,S$GLB,,

## 2025-04-22 PROCEDURE — 1159F MED LIST DOCD IN RCRD: CPT | Mod: CPTII,S$GLB,,

## 2025-04-22 PROCEDURE — 1160F RVW MEDS BY RX/DR IN RCRD: CPT | Mod: CPTII,S$GLB,,

## 2025-04-22 RX ORDER — LOSARTAN POTASSIUM 100 MG/1
100 TABLET ORAL DAILY
Qty: 90 TABLET | Refills: 3 | Status: SHIPPED | OUTPATIENT
Start: 2025-04-22 | End: 2026-04-22

## 2025-04-22 NOTE — TELEPHONE ENCOUNTER
----- Message from Med Assistant Disla sent at 4/22/2025  8:13 AM CDT -----  Contact: Plio/physical therapy    ----- Message -----  From: Anabell Luna  Sent: 4/21/2025   3:31 PM CDT  To: [facility] Staff    Pilo with physical therapy called to inform that patient does not know how to use the debra equipment , trying to assignment to the home health team to teach them how to use it.  #[phone]. Thank you.

## 2025-04-22 NOTE — TELEPHONE ENCOUNTER
Spoke with pt and was advised that he received the debra lift from [facility] [facility] , I contacted his physical therapy and lvm with this information .

## 2025-04-22 NOTE — PROGRESS NOTES
Clinic Note  4/22/2025      Subjective:       Patient ID:  Jose is a 69 y.o. male being seen for an established visit.    Chief Complaint: Follow-up and Hypertension    Patient presents to clinic today for BP check     He presents with his wife on a stretcher today     Bp elevated today, pt is asymptomatic, he reports he is taking his blood pressure medications daily and has no active concerns. Tolerating medication well, denies any a/e to medication.     He denies any other concerns today       Review of Systems   Constitutional:  Negative for appetite change, fatigue, fever and unexpected weight change.   HENT:  Negative for hearing loss and sore throat.    Eyes:  Negative for pain and visual disturbance.   Respiratory:  Negative for cough, shortness of breath and wheezing.    Cardiovascular:  Negative for chest pain, palpitations and leg swelling.   Gastrointestinal:  Negative for abdominal pain, blood in stool, constipation, diarrhea, nausea and vomiting.   Genitourinary:  Negative for dysuria.   Musculoskeletal:  Negative for arthralgias.   Neurological:  Negative for dizziness and headaches.   Psychiatric/Behavioral:  Negative for suicidal ideas.         Medication List with Changes/Refills   Current Medications    APIXABAN (ELIQUIS) 5 MG TAB    Take 1 tablet (5 mg total) by mouth 2 (two) times daily.    ATORVASTATIN (LIPITOR) 40 MG TABLET    Take 1 tablet by mouth once daily.    CO-ENZYME Q-10 50 MG CAPSULE    Take 50 mg by mouth once daily.    DIAPER,BRIEF,ADULT,DISPOSABLE MISC    1 each by Misc.(Non-Drug; Combo Route) route 3 (three) times daily.    DIMETHYL FUMARATE (TECFIDERA) 240 MG CPDR    Take 240 mg by mouth 2 (two) times daily.    ERGOCALCIFEROL (VITAMIN D2) 50,000 UNIT CAP    Take 50,000 Units by mouth every 7 days.    FISH OIL-OMEGA-3 FATTY ACIDS 300-1,000 MG CAPSULE    Take 1 capsule by mouth once daily.    FUROSEMIDE (LASIX) 20 MG TABLET    Take 1 tablet (20 mg total) by mouth once daily.     "HYDROCHLOROTHIAZIDE 12.5 MG TAB    Take 1 tablet (12.5 mg total) by mouth once daily.    LACTULOSE (CHRONULAC) 10 GRAM/15 ML SOLUTION    Take 15 mLs (10 g total) by mouth daily as needed (constipation).    MULTIVITAMIN (THERAGRAN) PER TABLET    Take 1 tablet by mouth once daily.    OMEGA-3 FATTY ACIDS 1,000 MG CAP    Take 2 capsules by mouth once daily.    POLYETHYLENE GLYCOL (GLYCOLAX) 17 GRAM/DOSE POWDER    Take 17 g by mouth daily as needed for Constipation.    VIT C/E/ZINC/LUTEIN/ZEAXANTHIN (OCUVITE EYE HEALTH ORAL)    Take by mouth.    VITAMIN D 1000 UNITS TAB    Take 5,000 Units by mouth once daily.   Changed and/or Refilled Medications    Modified Medication Previous Medication    LOSARTAN (COZAAR) 100 MG TABLET losartan (COZAAR) 50 MG tablet       Take 1 tablet (100 mg total) by mouth once daily.    Take 1 tablet (50 mg total) by mouth once daily.   Discontinued Medications    FISH OIL-OMEGA-3 FATTY ACIDS 300-1,000 MG CAPSULE    Take by mouth once daily.       Problem List[1]        Objective:      BP (!) 148/82 (BP Location: Left arm, Patient Position: Lying)   Pulse 68   Temp 98.2 °F (36.8 °C) (Oral)   Resp 18   SpO2 97%   Estimated body mass index is 25 kg/m² as calculated from the following:    Height as of 4/7/25: 6' 3" (1.905 m).    Weight as of 4/7/25: 90.7 kg (200 lb).  Physical Exam  Vitals and nursing note reviewed.   Constitutional:       General: He is not in acute distress.  HENT:      Head: Atraumatic.   Cardiovascular:      Rate and Rhythm: Normal rate and regular rhythm.      Heart sounds: No murmur heard.  Pulmonary:      Effort: Pulmonary effort is normal. No respiratory distress.      Breath sounds: Normal breath sounds. No wheezing, rhonchi or rales.   Musculoskeletal:         General: Normal range of motion.      Right lower leg: Edema (nonpitting) present.      Left lower leg: Edema (nonpitting) present.   Skin:     General: Skin is dry.   Neurological:      Mental Status: He is " alert and oriented to person, place, and time.   Psychiatric:         Mood and Affect: Mood normal.         Thought Content: Thought content normal.             Assessment and Plan:   -HTN   Dash diet   Continue HCTZ 12.5 mg   Increase Losartan 50 mg to 100 mg   Reassess in 2 weeks , BP log at home advised to present to next appt       Problem List Items Addressed This Visit       Hypertension    Relevant Medications    losartan (COZAAR) 100 MG tablet     Reviewed risks, benefits, and appropriate medication use prescribed  Discussed LS changes as appropriate   Reviewed cancer screening guidelines   Advised to keep speciality and follow up appointments as scheduled   Reviewed ER precautions   Verbalized understanding and is agreeable to plan      Follow Up:   Follow up in about 6 months (around 10/22/2025), or if symptoms worsen or fail to improve, for htn , 2 week nurse visit .    Other Orders Placed This Visit:  No orders of the defined types were placed in this encounter.          HALEY Garcia              [1]   Patient Active Problem List  Diagnosis    Onychomycosis    Seborrheic dermatitis    Hypertension    Multiple sclerosis    Constipation    Diarrhea    Right bundle branch block    Borderline hyperlipidemia    Hemangioma of liver    Bilateral adrenal adenomas    DVT of lower extremity (deep venous thrombosis)    Pre-diabetes    Pure hypercholesterolemia    Meningioma

## 2025-04-25 DIAGNOSIS — G35 MULTIPLE SCLEROSIS: Primary | ICD-10-CM

## 2025-04-28 ENCOUNTER — TELEPHONE (OUTPATIENT)
Dept: NEUROLOGY | Facility: CLINIC | Age: 70
End: 2025-04-28
Payer: MEDICARE

## 2025-04-28 NOTE — TELEPHONE ENCOUNTER
Referral from IM but pt does have notes and MRI reports in [facility] that support DX of MS. Msg to Sujatha to sched NP and advise pt he will need Cd of MRI images at the time of his appt

## 2025-04-30 ENCOUNTER — TELEPHONE (OUTPATIENT)
Dept: NEUROLOGY | Facility: CLINIC | Age: 70
End: 2025-04-30
Payer: MEDICARE

## 2025-04-30 ENCOUNTER — TELEPHONE (OUTPATIENT)
Dept: PRIMARY CARE CLINIC | Facility: CLINIC | Age: 70
End: 2025-04-30
Payer: MEDICARE

## 2025-04-30 DIAGNOSIS — H53.8 BLURRY VISION, BILATERAL: Primary | ICD-10-CM

## 2025-04-30 NOTE — TELEPHONE ENCOUNTER
----- Message from Blanche sent at 4/30/2025  9:21 AM CDT -----  Contact: [phone]  Requesting a referral be entered for pt to have annual eye exam done through [facility]. He states he needs to go there with his insurance. He had no further info. Please call pt when completed and authorized through pre service. Thanks

## 2025-04-30 NOTE — TELEPHONE ENCOUNTER
Spoke with patient and he stated he needs a referral to get an eye exam at [facility] because he needs his glasses.     He stated that his insurance [employer] will not pay for the transportation to Dr. Berry but will pay for him to go to [facility] but they said he needs a referral.

## 2025-05-07 ENCOUNTER — TELEPHONE (OUTPATIENT)
Dept: PRIMARY CARE CLINIC | Facility: CLINIC | Age: 70
End: 2025-05-07
Payer: MEDICARE

## 2025-05-07 NOTE — TELEPHONE ENCOUNTER
----- Message from Marco sent at 5/7/2025 12:08 PM CDT -----  Regarding: Rescheduling Nurse Visit  Contact: Pt [phone]  [email or web address] Patient is calling for Medical Advice regarding: Patient needing to reschedule nurse visit for BP check. He says he needs a gurny and ambulance to bring him to appointment. Please call patient at number provided.How long has patient had these symptoms:Pharmacy name and phone#:Patient wants a call back or thru [facility]: CallComments:Please advise patient replies from provider may take up to 48 hours.

## 2025-05-23 ENCOUNTER — OFFICE VISIT (OUTPATIENT)
Dept: NEUROLOGY | Facility: CLINIC | Age: 70
End: 2025-05-23
Payer: MEDICARE

## 2025-05-23 ENCOUNTER — LAB VISIT (OUTPATIENT)
Dept: LAB | Facility: HOSPITAL | Age: 70
End: 2025-05-23
Attending: STUDENT IN AN ORGANIZED HEALTH CARE EDUCATION/TRAINING PROGRAM
Payer: MEDICARE

## 2025-05-23 VITALS
HEART RATE: 64 BPM | HEIGHT: 75 IN | WEIGHT: 199.94 LBS | DIASTOLIC BLOOD PRESSURE: 81 MMHG | SYSTOLIC BLOOD PRESSURE: 130 MMHG | BODY MASS INDEX: 24.86 KG/M2

## 2025-05-23 DIAGNOSIS — E55.9 VITAMIN D DEFICIENCY: ICD-10-CM

## 2025-05-23 DIAGNOSIS — R26.9 UNSPECIFIED ABNORMALITIES OF GAIT AND MOBILITY: ICD-10-CM

## 2025-05-23 DIAGNOSIS — Z79.899 OTHER LONG TERM (CURRENT) DRUG THERAPY: ICD-10-CM

## 2025-05-23 DIAGNOSIS — G35 MULTIPLE SCLEROSIS: ICD-10-CM

## 2025-05-23 DIAGNOSIS — E64.0 SEQUELAE OF PROTEIN-CALORIE MALNUTRITION: ICD-10-CM

## 2025-05-23 LAB
25(OH)D3+25(OH)D2 SERPL-MCNC: 66 NG/ML (ref 30–96)
ABSOLUTE EOSINOPHIL (OHS): 0.46 K/UL
ABSOLUTE MONOCYTE (OHS): 0.53 K/UL (ref 0.3–1)
ABSOLUTE NEUTROPHIL COUNT (OHS): 5.21 K/UL (ref 1.8–7.7)
ALBUMIN SERPL BCP-MCNC: 3.7 G/DL (ref 3.5–5.2)
ALP SERPL-CCNC: 65 UNIT/L (ref 40–150)
ALT SERPL W/O P-5'-P-CCNC: 20 UNIT/L (ref 10–44)
AST SERPL-CCNC: 11 UNIT/L (ref 11–45)
BASOPHILS # BLD AUTO: 0.08 K/UL
BASOPHILS NFR BLD AUTO: 1.1 %
BILIRUB DIRECT SERPL-MCNC: 0.1 MG/DL (ref 0.1–0.3)
BILIRUB SERPL-MCNC: 0.6 MG/DL (ref 0.1–1)
ERYTHROCYTE [DISTWIDTH] IN BLOOD BY AUTOMATED COUNT: 13.9 % (ref 11.5–14.5)
FOLATE SERPL-MCNC: 13.9 NG/ML (ref 4–24)
HAV AB SER QL IA: NORMAL
HBV CORE AB SERPL QL IA: NORMAL
HBV SURFACE AB SER-ACNC: <3 MIU/ML
HBV SURFACE AB SERPL IA-ACNC: NORMAL M[IU]/ML
HBV SURFACE AG SERPL QL IA: NORMAL
HCT VFR BLD AUTO: 43 % (ref 40–54)
HCV AB SERPL QL IA: NORMAL
HGB BLD-MCNC: 14.4 GM/DL (ref 14–18)
HIV 1+2 AB+HIV1 P24 AG SERPL QL IA: NORMAL
IGA SERPL-MCNC: 154 MG/DL (ref 40–350)
IGG SERPL-MCNC: 610 MG/DL (ref 650–1600)
IGM SERPL-MCNC: 19 MG/DL (ref 50–300)
IMM GRANULOCYTES # BLD AUTO: 0.08 K/UL (ref 0–0.04)
IMM GRANULOCYTES NFR BLD AUTO: 1.1 % (ref 0–0.5)
LYMPHOCYTES # BLD AUTO: 1.1 K/UL (ref 1–4.8)
MCH RBC QN AUTO: 28.7 PG (ref 27–31)
MCHC RBC AUTO-ENTMCNC: 33.5 G/DL (ref 32–36)
MCV RBC AUTO: 86 FL (ref 82–98)
NUCLEATED RBC (/100WBC) (OHS): 0 /100 WBC
PLATELET # BLD AUTO: 231 K/UL (ref 150–450)
PMV BLD AUTO: 9.2 FL (ref 9.2–12.9)
PROT SERPL-MCNC: 6.5 GM/DL (ref 6–8.4)
RBC # BLD AUTO: 5.01 M/UL (ref 4.6–6.2)
RELATIVE EOSINOPHIL (OHS): 6.2 %
RELATIVE LYMPHOCYTE (OHS): 14.7 % (ref 18–48)
RELATIVE MONOCYTE (OHS): 7.1 % (ref 4–15)
RELATIVE NEUTROPHIL (OHS): 69.8 % (ref 38–73)
WBC # BLD AUTO: 7.46 K/UL (ref 3.9–12.7)

## 2025-05-23 PROCEDURE — 84425 ASSAY OF VITAMIN B-1: CPT

## 2025-05-23 PROCEDURE — 86787 VARICELLA-ZOSTER ANTIBODY: CPT

## 2025-05-23 PROCEDURE — 82040 ASSAY OF SERUM ALBUMIN: CPT

## 2025-05-23 PROCEDURE — 86360 T CELL ABSOLUTE COUNT/RATIO: CPT

## 2025-05-23 PROCEDURE — 82746 ASSAY OF FOLIC ACID SERUM: CPT

## 2025-05-23 PROCEDURE — 86790 VIRUS ANTIBODY NOS: CPT

## 2025-05-23 PROCEDURE — 87340 HEPATITIS B SURFACE AG IA: CPT

## 2025-05-23 PROCEDURE — 83884 ASSAY NEURFLMNT LIGHT CHAIN: CPT

## 2025-05-23 PROCEDURE — 86682 HELMINTH ANTIBODY: CPT

## 2025-05-23 PROCEDURE — 82306 VITAMIN D 25 HYDROXY: CPT

## 2025-05-23 PROCEDURE — 86704 HEP B CORE ANTIBODY TOTAL: CPT

## 2025-05-23 PROCEDURE — 85025 COMPLETE CBC W/AUTO DIFF WBC: CPT

## 2025-05-23 PROCEDURE — 82784 ASSAY IGA/IGD/IGG/IGM EACH: CPT

## 2025-05-23 PROCEDURE — 83520 IMMUNOASSAY QUANT NOS NONAB: CPT

## 2025-05-23 PROCEDURE — 86706 HEP B SURFACE ANTIBODY: CPT

## 2025-05-23 PROCEDURE — 86711 JOHN CUNNINGHAM ANTIBODY: CPT

## 2025-05-23 PROCEDURE — 86803 HEPATITIS C AB TEST: CPT

## 2025-05-23 PROCEDURE — 87389 HIV-1 AG W/HIV-1&-2 AB AG IA: CPT

## 2025-05-23 PROCEDURE — 99999 PR PBB SHADOW E&M-EST. PATIENT-LVL V: CPT | Mod: PBBFAC,,, | Performed by: STUDENT IN AN ORGANIZED HEALTH CARE EDUCATION/TRAINING PROGRAM

## 2025-05-23 PROCEDURE — 82607 VITAMIN B-12: CPT

## 2025-05-23 PROCEDURE — 83921 ORGANIC ACID SINGLE QUANT: CPT

## 2025-05-23 PROCEDURE — 36415 COLL VENOUS BLD VENIPUNCTURE: CPT

## 2025-05-23 PROCEDURE — 86480 TB TEST CELL IMMUN MEASURE: CPT

## 2025-05-23 RX ORDER — BACLOFEN 5 MG/1
5 TABLET ORAL 3 TIMES DAILY PRN
Qty: 90 TABLET | Refills: 3 | Status: SHIPPED | OUTPATIENT
Start: 2025-05-23

## 2025-05-23 NOTE — PROGRESS NOTES
[facility]  New Patient Visit      Disease Summary     NEURO MULTIPLE SCLEROISIS SUMMARY:   Principle neurological diagnosis:  MS  Date of Symptom Onset:  2004  Date of Diagnosis:  2004  Disease type at diagnosis:  Primary Progressive MS  Disease type currently:  Primary Progressive MS  Previous Therapy:  First DMT:  Interferon beta-1a IM - injection fatigue  Second DMT:  Interferon beta-1a SQ - injection fatigue  Current Therapy:  Dimethyl fumarate - Since 2010 - present   Last MRI Brain:  3/23/2025 - stable  Last MRI C-Spine:  3/23/2025 - stable  Last MRI T-Spine:  3/23/2025 - stable  Labs   No JCV completed  Lab Notes:  CSF 2004 - reportedly positive         History:     Previous records (physician notes, laboratory reports, and radiology reports) and imaging studies were reviewed and summarized. My recommendations will be communicated back to the patient's physician(s) by EMR/mail      He has been diagnosed with MS since early 2000s in [facility]. 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  He reportedly had positive MRI and CSF that supported MS diagnosis. Received steroids briefly, and then started on Avonex. He was then switched to Rebif 2 years later. He became tired of injections and was switched to Tecfidera in 2010s when it became available. [place] and has not been able to resume working.     He has been on Tecfidera since then.     MRI 2017 with [facility], stable spine   MRI 2020 - stable  MRI spine 2022 demonstrated enlarging C-spine lesions, but stable brain lesions.  MRI brain and spine 3/2025 - stable     He was walking independently (with walker as back-up) until he slipped and fell and broke his R heel 2020s since then has he has require electric wheelchair full time.   He has an electric wheelchair for which he is fully dependent on since early 2020s, he has a lift but it doesn't work as well.      Current symptoms:  Vision changes - needs glasses, but unclear if he had ON or not despite 2017 MRI report  Bilateral leg weakness, worse on R, cannot stand, he is able to sit with some difficulty  R arm weakness  Memory loss - short term memory loss  Constipation - has large bowel obstruction which has improved now, but also has diarrhea   Urinary dysfunction - incontinence, has condom catheter  Muscle spasms    Denies dysphagia, dysarthria, sensory changes    He currently lives at home with his wife. He has home health RN who helps with monitoring vitals, bloodwork.     ROS:     A complete 9 system ROS was reviewed by me and otherwise negative .     Past Medical History:   Diagnosis Date    Glaucoma     Hypertension     MS (multiple sclerosis)     Multiple sclerosis        Past Surgical History:   Procedure Laterality Date    APPENDECTOMY  1969    CYST REMOVAL Right     right forearm    HERNIA REPAIR      TONSILLECTOMY         Family History   Problem Relation Name Age of Onset    Diabetes Mother      Transient ischemic attack Mother          several    Stroke Mother      Diabetes Father  70        mild    Heart attack Neg Hx      Lung cancer Neg Hx      Colon cancer Neg Hx      Prostate cancer Neg Hx         Social History     Socioeconomic History    Marital status:    Tobacco Use    Smoking status: Never   Substance and Sexual Activity    Alcohol use: Not Currently     Comment: about 1 drink every 2 months.    Drug use: No    Sexual activity: Yes   Social History Narrative    ** Merged History Encounter **         ** Merged History Encounter **          Social Drivers of Health     Food Insecurity: No Food Insecurity (3/24/2025)    Received from [facility]    Hunger Vital Sign     Worried About Running Out of Food in the Last Year: Never true     Ran Out of Food in the Last Year: Never true   Transportation Needs: No Transportation Needs (3/24/2025)    Received from [facility]    PRAPARE -  "Transportation     Lack of Transportation (Medical): No     Lack of Transportation (Non-Medical): No   Housing Stability: Low Risk  (3/24/2025)    Received from [facility]    Housing Stability Vital Sign     Unable to Pay for Housing in the Last Year: No     Number of Times Moved in the Last Year: 0     Homeless in the Last Year: No       Review of patient's allergies indicates:  No Known Allergies    Living arrangements - the patient lives with their family.    Patient Employment       Status   Retired               Exam:     Vitals:    05/23/25 1203   BP: 130/81   BP Location: Left arm   Patient Position: Sitting   Pulse: 64   Weight: 90.7 kg (199 lb 15.3 oz)   Height: 6' 3" (1.905 m)          In general, the patient is well nourished and appears to be in no acute distress.    MENTAL STATUS: language is fluent, normal verbal comprehension, short-term and remote memory is intact, attention is normal, patient is alert and oriented x 3, fund of knowlege is appropriate by vocabulary. Behavior and judgment appropriate with full medical decision making capacity.     CRANIAL NERVE EXAM:  There is no intrernuclear ophthalmoplegia.  Extraocular muscles are intact. Pupils are equal, round, and reactive to light. VFs intact. No facial asymmetry. Facial sensation is intact bilaterally. There is no dysarthria. Uvula is midline, and palate moves symmetrically. Shoulder shrug intact bilaterlly. Tongue protrusion is midline. Hearing is intact to finger rub bilaterally. Neck is supple with full ROM    MOTOR EXAM: Decreased bulk and tone throughout UE and LE bilaterally.  Right pronator drift; impaired rapid sequential tap;       Strength:  R deltoid 4/5, L deltoid 5/5  R biceps 4+/5, L biceps 5/5  R triceps 4/5, L triceps 4/5  R finger flexors 3+/5, L finger flexors 5/5  R  hip flexors 2+/5, L hip flexors 3-/5  R  hip extensors 4/5, L hip extensors 4/5  R knee extensors 4/5, L knee extensors 4/5  R knee flexors 3/5, L knee " "flexors 3/5  R ankle dorsiflexors 1/5, L ankle dorsiflexors 4+/5  R ankle plantarflexors 2/5, L ankle plantarflexors 4+/5    REFLEXES: 3+ and symmetric throughout in all four extremeties; positive R Wood's, +b/l cross-adductors    SENSORY EXAM: Normal to light touch, absent vibration sensation in b/l LE    COORDINATION: Bilateral dysmetria on FTN, legs unable to assess due to weakness    GAIT: Unable to ambulate       Imaging (personally reviewed):     MRI reports reviewed               Labs (personally reviewed):     No results found for: "KVEXVJGE92MO"  No results found for: "JCVINDEX", "JCVANTIBODY"  No results found for: "KO5RJNPI", "ABSOLUTECD3", "NI4ANZFF", "ABSOLUTECD8", "OA0FDYRJ", "ABSOLUTECD4", "LABCD48"  Lab Results   Component Value Date    WBC 8.3 03/29/2025    RBC 5.87 03/22/2025    HGB 12.6 (L) 03/29/2025    HCT 37.7 (L) 03/29/2025    MCV 83.0 03/29/2025    MCH 27.8 03/29/2025    MCHC 33.5 03/29/2025    RDW 14.2 03/29/2025     03/22/2025    MPV 6.7 (L) 03/29/2025    GRAN 4.9 06/01/2024    GRAN 73.0 06/01/2024    LYMPH 3.0 (L) 03/22/2025    LYMPH 0.51 (L) 03/22/2025    MONO 11.6 03/22/2025    MONO 1.94 (H) 03/22/2025    EOS 0.1 03/22/2025    EOS 0.01 03/22/2025    BASO 0.05 06/01/2024    EOSINOPHIL 5.3 06/01/2024    BASOPHIL 0.2 03/22/2025    BASOPHIL 0.04 03/22/2025     Sodium   Date Value Ref Range Status   03/29/2025 142 135 - 146 mmol/L Final   06/01/2024 148 (H) 136 - 145 mmol/L Final     Potassium   Date Value Ref Range Status   03/29/2025 4.0 3.6 - 5.2 mmol/L Final   06/01/2024 3.7 3.5 - 5.1 mmol/L Final     Chloride   Date Value Ref Range Status   03/22/2025 110 95 - 110 mmol/L Final   06/01/2024 107 95 - 110 mmol/L Final     CO2   Date Value Ref Range Status   06/01/2024 25 23 - 29 mmol/L Final     Carbon Dioxide   Date Value Ref Range Status   03/29/2025 24 24 - 32 mmol/L Final     Glucose   Date Value Ref Range Status   03/22/2025 182 (H) 70 - 110 mg/dL Final   06/01/2024 112 (H) " 70 - 110 mg/dL Final     BUN   Date Value Ref Range Status   03/29/2025 9.0 7.0 - 25.0 mg/dL Final   03/22/2025 32 (H) 8 - 23 mg/dL Final     Creatinine   Date Value Ref Range Status   03/29/2025 0.50 (L) 0.70 - 1.40 mg/dL Final   03/22/2025 0.7 0.5 - 1.4 mg/dL Final     Calcium   Date Value Ref Range Status   03/29/2025 8.6 8.4 - 10.3 mg/dL Final   06/01/2024 9.6 8.7 - 10.5 mg/dL Final     Protein Total   Date Value Ref Range Status   03/22/2025 6.4 6.0 - 8.4 gm/dL Final     Total Protein   Date Value Ref Range Status   06/01/2024 7.3 6.0 - 8.4 g/dL Final     Albumin   Date Value Ref Range Status   03/23/2025 3.2 (L) 3.4 - 5.0 g/dL Final   06/01/2024 4.4 3.5 - 5.2 g/dL Final     Total Bilirubin   Date Value Ref Range Status   03/23/2025 0.6 <1.3 mg/dL Final   06/01/2024 0.6 0.1 - 1.0 mg/dL Final     Comment:     For infants and newborns, interpretation of results should be based  on gestational age, weight and in agreement with clinical  observations.    Premature Infant recommended reference ranges:  Up to 24 hours.............<8.0 mg/dL  Up to 48 hours............<12.0 mg/dL  3-5 days..................<15.0 mg/dL  6-29 days.................<15.0 mg/dL       Alkaline Phosphatase   Date Value Ref Range Status   06/01/2024 58 55 - 135 U/L Final     ALP   Date Value Ref Range Status   03/22/2025 53 40 - 150 unit/L Final     AST   Date Value Ref Range Status   03/23/2025 17 <45 U/L Final   06/01/2024 14 10 - 40 U/L Final     ALT   Date Value Ref Range Status   03/23/2025 17 <46 U/L Final   06/01/2024 21 10 - 44 U/L Final     Anion Gap   Date Value Ref Range Status   03/29/2025 9 8 - 16 Final     Comment:     Calculation does not include K+   03/22/2025 16 8 - 16 mmol/L Final     eGFR if    Date Value Ref Range Status   02/04/2020 >60.0 >60 mL/min/1.73 m^2 Final     eGFR if non    Date Value Ref Range Status   02/04/2020 >60.0 >60 mL/min/1.73 m^2 Final     Comment:     Calculation used  to obtain the estimated glomerular filtration  rate (eGFR) is the CKD-EPI equation.          Diagnosis/Assessment/Plan:       NEURO MULTIPLE SCLEROSIS IMPRESSION:   Number of relapses in the past year?:  0  Clinical Progression:  Worsened  Type:  Progressive  MRI Progression:  Stable  MS Classification:  Primary Progressive MS  Current DMT: dimethyl fumarate  DMT:  Switch Disease Modifying therapy  Switch Disease Modifying Therapy TO:  Ocrelizumab  Symptom Management:  Implement change in symptom management  Implement Change in Symptom Management:  Adaptive Needs, Case Management, Spasticity and Cognitive  Additional Impressions:   PPMS since onset in 2004, severe disability currently, fully dependent on all ADLs.  Switch to Ocrevus from DMF due to effect on progression, check safety labs today  Obtain [facility] MRI CDs to review, updated imaging in 6 months      consult for home PT/OT, likely needs new lift equipment   Trial of muscle relaxers, 5mg baclofen TID   Check labs for contributors for cognitive changes  Check vit D level and adjust supplement as needed            Ankita Lind MD, MSc  Attending neurologist

## 2025-05-23 NOTE — Clinical Note
Transitioning him to Ocrevus from [facility] for PPMS. Safety labs ordered today

## 2025-05-23 NOTE — Clinical Note
Does he qualify for SSDI benefits? He's been out of work since 2010. He also needs [facility] for PT and OT He also needs a new lift equipment because the current one does not work and he cannot go to appointments due to being unable to get out of bed into his electric wheelchair.  Thanks

## 2025-05-26 ENCOUNTER — TELEPHONE (OUTPATIENT)
Dept: PSYCHIATRY | Facility: CLINIC | Age: 70
End: 2025-05-26
Payer: MEDICARE

## 2025-05-26 DIAGNOSIS — G35 MULTIPLE SCLEROSIS: Primary | ICD-10-CM

## 2025-05-26 LAB
CD3 ABSOLUTE FLOW (OHS): 790 CELLS/UL (ref 700–2100)
CD3 PERCENT (OHS): 67.69 % (ref 55–83)
CD3+CD4+ CELLS # SPEC: 592 CELLS/UL (ref 300–1400)
CD3+CD4+ CELLS NFR BLD: 50.73 % (ref 28–57)
CD3+CD8+ CELLS NFR SPEC: 15.71 % (ref 10–39)
CD4/CD8 RATIO FLOW (OHS): 3.23 (ref 0.9–3.6)
CD8 ABSOLUTE FLOW (OHS): 183 CELLS/UL (ref 200–900)
LABORATORY COMMENT REPORT: ABNORMAL
MITOGEN MINUS NIL (OHS): 9.99
NIL TB SYNCED (OHS): 0.01
QUANTIFERON GOLD INTERP (OHS): NEGATIVE
STRONGYLOIDES IGG SER QL IA: NEGATIVE
TB1 AG MINUS NIL (OHS): 0
TB2 AG MINUS NIL (OHS): 0
V.ZOSTER IGG INTERP (OHS): POSITIVE
VARICELLA ZOSTER IGG (OHS): 5.12 S/CO

## 2025-05-26 NOTE — TELEPHONE ENCOUNTER
SW received referral from FINA Lind re: SSDI questions, home health orders, and debra lift needs. 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 Pt is very upset about this, and believes it was not given to him on purpose. SW discussed the work criteria for SSDI, and Pt was not understanding at first. SW also discussed SSI with Pt as an option. SW urged Pt to apply for SSI, and inquire about previous denial with the SSA. Pt requested information be sent via mail, as Pt does not currently have easy accessibility to his computer.     SW provided HH orders to [facility] for PT/OT and debra sling consultation.

## 2025-05-26 NOTE — TELEPHONE ENCOUNTER
----- Message from Ankita Lind MD sent at 5/23/2025  1:08 PM CDT -----  Does he qualify for SSDI benefits? He's been out of work since 2010.  He also needs [facility] for PT and OT  He also needs a new lift equipment because the current one does not work and he cannot go to appointments due to being unable to get out of bed into his electric wheelchair.   Thanks

## 2025-05-27 ENCOUNTER — TELEPHONE (OUTPATIENT)
Dept: NEUROLOGY | Facility: CLINIC | Age: 70
End: 2025-05-27
Payer: MEDICARE

## 2025-05-27 LAB
NEUROFILAMENT LIGHT CHAIN, PLASMA: 13.9 PG/ML
VIT B12 SERPL-MCNC: 354 NG/L (ref 180–914)

## 2025-05-27 NOTE — TELEPHONE ENCOUNTER
----- Message from Ankita Lind MD sent at 5/23/2025  1:09 PM CDT -----  Transitioning him to [employer] from [employer] for PPMS. Safety labs ordered today

## 2025-05-27 NOTE — TELEPHONE ENCOUNTER
Ocrevus Zunovo new start - spoke to patient on 5/27 and he prefers Ocrevus Zunovo   Labs:  5/23/25  WBC 7.46  ALC 1097  AST 11 , ALT 20  IgG  610  IgM  19  IgA 154  CD8 + 183  HBsAg  - anti-Hbc - anti-Hbs -, not immune, no current or past infection  Hep C Ab  -  Hep A IgG -, not immune  HIV -  Varicella IgG +, immune  Strongyloides IgG -  TB gold -    Vaccines:  Influenza:  Due 2025 - 2026 Flu season  Pneumonia (Prevnar 20): One dose due now  Tetanus (TDAP): 11/20/2018, due 11/20/2028  Hepatitis B (Heplisav-B):  One dose due now, then another dose due in 28 days  Hepatitis A (Havrix):  One dose due now, then another dose due in 6 months  Shingles (Shingrix): One dose due now, then another dose due in 2 months  Covid:  Due 2025 -2026 Booster   Respiratory Syncytial Virus (Arexvy): One dose due now    Start Form:   Submitted via [employer] website    [place]:    Check with [facility]    Therapy plan:  Entered 5/27 Renato Manzo    Consent form:   Needs to be completed

## 2025-05-28 LAB
INDEX VALUE: 2.07
JCV ANTIBODY: POSITIVE
LC GLIAL FIBRILLARY ACID PROTEIN: 35.2 PG/ML (ref 0–186)

## 2025-05-29 LAB — METHYLMALONATE SERPL-SCNC: 0.13 NMOL/ML

## 2025-06-10 ENCOUNTER — PATIENT OUTREACH (OUTPATIENT)
Dept: ADMINISTRATIVE | Facility: HOSPITAL | Age: 70
End: 2025-06-10
Payer: MEDICARE

## 2025-06-10 VITALS — DIASTOLIC BLOOD PRESSURE: 81 MMHG | SYSTOLIC BLOOD PRESSURE: 130 MMHG

## 2025-06-20 ENCOUNTER — TELEPHONE (OUTPATIENT)
Dept: PSYCHIATRY | Facility: CLINIC | Age: 70
End: 2025-06-20
Payer: MEDICARE

## 2025-06-20 DIAGNOSIS — G35 MULTIPLE SCLEROSIS: Primary | ICD-10-CM

## 2025-06-20 NOTE — TELEPHONE ENCOUNTER
MONI phoned Pt to help him complete [employer] financial assistance form. While on the call, MONI addressed several other case management needs: Pt is still experiencing difficulty transferring to and from PWC even with help of PT/OT. Pt's PWCs are not suitable for him anymore and he could benefit from modifications or a completely new WC, he's applied for Medicaid but hasn't heard back, has an outstanding bill with [facility] for most recent transport from ofc visit, has no assistance when wife is out of the house, and endorses short term memory concerns.    MONI's plan:  Address PT/OT needs - call [facility] to connect with therapist and address concerns, discuss adaptive needs assessment, etc.   Inquire about HH  or  to assist Pt in applying for waiver services and a more thorough needs assessment (may also be able to help with medical information organization and assist with VV)  Inquire about including wife in calls using    Contact [facility] to clarify outstanding bill  Address need for new PWC  Get more information on Vira, who is the lady who helped him apply for Medicaid

## 2025-06-23 NOTE — TELEPHONE ENCOUNTER
"SW received a VM from Cindy Dahl (director of Pt's HH agency) last week returning a call. SW phoned Cindy this morning to discuss Pt's PT/OT needs. She stated that PT (Jesse) did an intake assessment on 6/11/25, and provided debra lift education to Pt and Pt's wife. SW expressed concerns about Pt's needs being addressed while wife is away, and Cindy encouraged SW to provide PT/OT order for an adaptive needs assessment. Cindy mentioned several times that she believes Pt could benefit from inpatient rehabiliation, but did not explicitly state that HH could not support the Pt's needs when asked to clarify.     MONI inquired about whether the agency had a SW or  that could help Pt apply for certain resources/services, Cindy stated that they did not have one at this time. She also stated she was not familiar with a "Vira" who "helped Pt apply for Medicaid".  "

## 2025-06-27 NOTE — TELEPHONE ENCOUNTER
MONI received call from Katja with [facility] to confirm fax number and office number. She requested that his ongoing HH orders be sent to FINA Lind for signatures.       [email or web address]

## 2025-06-30 RX ORDER — DIPHENHYDRAMINE HCL 50 MG
50 CAPSULE ORAL
OUTPATIENT
Start: 2025-06-30

## 2025-06-30 RX ORDER — ACETAMINOPHEN 500 MG
1000 TABLET ORAL
OUTPATIENT
Start: 2025-06-30

## 2025-06-30 RX ORDER — FAMOTIDINE 20 MG/1
20 TABLET, FILM COATED ORAL
OUTPATIENT
Start: 2025-06-30

## 2025-06-30 RX ORDER — DEXAMETHASONE 4 MG/1
20 TABLET ORAL
OUTPATIENT
Start: 2025-06-30

## 2025-06-30 RX ORDER — DIPHENHYDRAMINE HYDROCHLORIDE 50 MG/ML
50 INJECTION, SOLUTION INTRAMUSCULAR; INTRAVENOUS
OUTPATIENT
Start: 2025-06-30

## 2025-06-30 RX ORDER — EPINEPHRINE 0.3 MG/.3ML
0.3 INJECTION SUBCUTANEOUS
OUTPATIENT
Start: 2025-06-30

## 2025-07-11 ENCOUNTER — TELEPHONE (OUTPATIENT)
Dept: NEUROLOGY | Facility: CLINIC | Age: 70
End: 2025-07-11
Payer: MEDICARE

## 2025-07-11 NOTE — TELEPHONE ENCOUNTER
Copied from CRM #[id number]. Topic: General Inquiry - Patient Advice  >> Jul 9, 2025  2:02 PM Poornima wrote:  Type:  Patient Advise    Who Called: Jose    Reason For call:Pt called states need to speak with someone in regards to getting approval for MS treatment. Please advise    Would the patient rather a call back or a response via [facility]? N/a    Best Call Back Number: Telephone Information:  Mobile          [phone]  >> Jul 9, 2025  4:15 PM Serene Francois wrote:    ----- Message -----  From: Poornima Schultz  Sent: 7/9/2025   2:04 PM CDT  To: Diogo Haynes

## 2025-07-11 NOTE — TELEPHONE ENCOUNTER
Spoke to patient and provided # to [facility] to help with Ocrevus Zunovo infusion. He is no longer taking Tecfidera. Aware to schedule infusion as soon as possible

## 2025-07-14 ENCOUNTER — TELEPHONE (OUTPATIENT)
Dept: NEUROLOGY | Facility: CLINIC | Age: 70
End: 2025-07-14
Payer: MEDICARE

## 2025-07-14 NOTE — TELEPHONE ENCOUNTER
Copied from CRM #[id number]. Topic: General Inquiry - Patient Advice  >> Jul 11, 2025  1:38 PM Tanisha wrote:  Pt is calling to speak to you about infusion please call

## 2025-07-22 DIAGNOSIS — G35 MULTIPLE SCLEROSIS: Primary | ICD-10-CM

## 2025-07-22 RX ORDER — FAMOTIDINE 20 MG/1
20 TABLET, FILM COATED ORAL ONCE
Qty: 1 TABLET | Refills: 0 | Status: SHIPPED | OUTPATIENT
Start: 2025-07-22 | End: 2025-07-22

## 2025-07-22 RX ORDER — DEXAMETHASONE 4 MG/1
20 TABLET ORAL ONCE
Qty: 5 TABLET | Refills: 0 | Status: SHIPPED | OUTPATIENT
Start: 2025-07-22 | End: 2025-07-22

## 2025-07-22 NOTE — TELEPHONE ENCOUNTER
Received call from [facility] that they are not able to dispense oral decadron and famotidine. RX sent to [facility] in [place].     Patient will have wife go  medication. Aware not to take pre-medication until nurse arrives for infusion    Nurse Li for [facility] made aware. Provided nurse with my direct line and cell phone #

## 2025-07-30 ENCOUNTER — TELEPHONE (OUTPATIENT)
Dept: NEUROLOGY | Facility: CLINIC | Age: 70
End: 2025-07-30
Payer: MEDICARE

## 2025-07-30 NOTE — TELEPHONE ENCOUNTER
Spoke with pt in regards to 8/20 appt scheduled as an in person. Called pt to reschedule ut pt advised he will not be able to do a visit at this moment because he has no insurance. Stated cost would be 1,000 to do an in person visit. Offered to do a virtual as well but pt advised he will not be able to do that either because he cannot access his computer ; also does not have portal set up. Advised pt when he is able to schedule his follow up to call and let us know. Pt verbalized understanding.